# Patient Record
Sex: FEMALE | Race: ASIAN | NOT HISPANIC OR LATINO | Employment: FULL TIME | URBAN - METROPOLITAN AREA
[De-identification: names, ages, dates, MRNs, and addresses within clinical notes are randomized per-mention and may not be internally consistent; named-entity substitution may affect disease eponyms.]

---

## 2018-06-12 ENCOUNTER — HOSPITAL ENCOUNTER (INPATIENT)
Facility: MEDICAL CENTER | Age: 33
LOS: 4 days | DRG: 778 | End: 2018-06-16
Attending: OBSTETRICS & GYNECOLOGY | Admitting: OBSTETRICS & GYNECOLOGY

## 2018-06-12 PROBLEM — O60.00 PREMATURE LABOR: Status: ACTIVE | Noted: 2018-06-12

## 2018-06-12 LAB
ALBUMIN SERPL BCP-MCNC: 3.1 G/DL (ref 3.2–4.9)
ALBUMIN/GLOB SERPL: 0.8 G/DL
ALP SERPL-CCNC: 107 U/L (ref 30–99)
ALT SERPL-CCNC: 9 U/L (ref 2–50)
ANION GAP SERPL CALC-SCNC: 12 MMOL/L (ref 0–11.9)
APPEARANCE UR: CLEAR
AST SERPL-CCNC: 11 U/L (ref 12–45)
BACTERIA GENITAL QL WET PREP: NORMAL
BILIRUB SERPL-MCNC: 0.3 MG/DL (ref 0.1–1.5)
BILIRUB UR QL STRIP.AUTO: NEGATIVE
BUN SERPL-MCNC: 9 MG/DL (ref 8–22)
CALCIUM SERPL-MCNC: 7.5 MG/DL (ref 8.5–10.5)
CHLORIDE SERPL-SCNC: 104 MMOL/L (ref 96–112)
CO2 SERPL-SCNC: 17 MMOL/L (ref 20–33)
COLOR UR: YELLOW
CREAT SERPL-MCNC: 0.48 MG/DL (ref 0.5–1.4)
ERYTHROCYTE [DISTWIDTH] IN BLOOD BY AUTOMATED COUNT: 41.3 FL (ref 35.9–50)
GLOBULIN SER CALC-MCNC: 3.9 G/DL (ref 1.9–3.5)
GLUCOSE BLD-MCNC: 129 MG/DL (ref 65–99)
GLUCOSE SERPL-MCNC: 95 MG/DL (ref 65–99)
GLUCOSE UR STRIP.AUTO-MCNC: NEGATIVE MG/DL
GP B STREP DNA SPEC QL NAA+PROBE: NEGATIVE
HCT VFR BLD AUTO: 36.5 % (ref 37–47)
HGB BLD-MCNC: 12.4 G/DL (ref 12–16)
KETONES UR STRIP.AUTO-MCNC: 15 MG/DL
LEUKOCYTE ESTERASE UR QL STRIP.AUTO: NEGATIVE
MAGNESIUM SERPL-MCNC: 5.9 MG/DL (ref 1.5–2.5)
MCH RBC QN AUTO: 31 PG (ref 27–33)
MCHC RBC AUTO-ENTMCNC: 34 G/DL (ref 33.6–35)
MCV RBC AUTO: 91.3 FL (ref 81.4–97.8)
MICRO URNS: ABNORMAL
NITRITE UR QL STRIP.AUTO: NEGATIVE
PH UR STRIP.AUTO: 8.5 [PH]
PLATELET # BLD AUTO: 238 K/UL (ref 164–446)
PMV BLD AUTO: 10.1 FL (ref 9–12.9)
POTASSIUM SERPL-SCNC: 3.7 MMOL/L (ref 3.6–5.5)
PROT SERPL-MCNC: 7 G/DL (ref 6–8.2)
PROT UR QL STRIP: NEGATIVE MG/DL
RBC # BLD AUTO: 4 M/UL (ref 4.2–5.4)
RBC UR QL AUTO: NEGATIVE
SIGNIFICANT IND 70042: NORMAL
SITE SITE: NORMAL
SODIUM SERPL-SCNC: 133 MMOL/L (ref 135–145)
SOURCE SOURCE: NORMAL
SP GR UR STRIP.AUTO: 1.01
UROBILINOGEN UR STRIP.AUTO-MCNC: 0.2 MG/DL
WBC # BLD AUTO: 13.2 K/UL (ref 4.8–10.8)

## 2018-06-12 PROCEDURE — 700111 HCHG RX REV CODE 636 W/ 250 OVERRIDE (IP): Performed by: ADVANCED PRACTICE MIDWIFE

## 2018-06-12 PROCEDURE — 770002 HCHG ROOM/CARE - OB PRIVATE (112)

## 2018-06-12 PROCEDURE — 83735 ASSAY OF MAGNESIUM: CPT

## 2018-06-12 PROCEDURE — A9270 NON-COVERED ITEM OR SERVICE: HCPCS | Performed by: ADVANCED PRACTICE MIDWIFE

## 2018-06-12 PROCEDURE — 700105 HCHG RX REV CODE 258

## 2018-06-12 PROCEDURE — 82962 GLUCOSE BLOOD TEST: CPT

## 2018-06-12 PROCEDURE — 700111 HCHG RX REV CODE 636 W/ 250 OVERRIDE (IP)

## 2018-06-12 PROCEDURE — 80053 COMPREHEN METABOLIC PANEL: CPT

## 2018-06-12 PROCEDURE — 700111 HCHG RX REV CODE 636 W/ 250 OVERRIDE (IP): Performed by: OBSTETRICS & GYNECOLOGY

## 2018-06-12 PROCEDURE — 36415 COLL VENOUS BLD VENIPUNCTURE: CPT

## 2018-06-12 PROCEDURE — 85027 COMPLETE CBC AUTOMATED: CPT

## 2018-06-12 PROCEDURE — 700112 HCHG RX REV CODE 229: Performed by: ADVANCED PRACTICE MIDWIFE

## 2018-06-12 PROCEDURE — 302790 HCHG STAT ANTEPARTUM CARE, DAILY

## 2018-06-12 PROCEDURE — 700105 HCHG RX REV CODE 258: Performed by: ADVANCED PRACTICE MIDWIFE

## 2018-06-12 PROCEDURE — 81003 URINALYSIS AUTO W/O SCOPE: CPT

## 2018-06-12 PROCEDURE — 87653 STREP B DNA AMP PROBE: CPT

## 2018-06-12 RX ORDER — MAGNESIUM SULFATE HEPTAHYDRATE 40 MG/ML
4 INJECTION, SOLUTION INTRAVENOUS ONCE
Status: COMPLETED | OUTPATIENT
Start: 2018-06-12 | End: 2018-06-12

## 2018-06-12 RX ORDER — DOCUSATE SODIUM 100 MG/1
100 CAPSULE, LIQUID FILLED ORAL 2 TIMES DAILY
Status: DISCONTINUED | OUTPATIENT
Start: 2018-06-12 | End: 2018-06-16 | Stop reason: HOSPADM

## 2018-06-12 RX ORDER — MAGNESIUM SULFATE HEPTAHYDRATE 40 MG/ML
INJECTION, SOLUTION INTRAVENOUS
Status: COMPLETED
Start: 2018-06-12 | End: 2018-06-12

## 2018-06-12 RX ORDER — ACETAMINOPHEN 325 MG/1
650 TABLET ORAL EVERY 4 HOURS PRN
Status: DISCONTINUED | OUTPATIENT
Start: 2018-06-12 | End: 2018-06-16 | Stop reason: HOSPADM

## 2018-06-12 RX ORDER — VITAMIN A ACETATE, BETA CAROTENE, ASCORBIC ACID, CHOLECALCIFEROL, .ALPHA.-TOCOPHEROL ACETATE, DL-, THIAMINE MONONITRATE, RIBOFLAVIN, NIACINAMIDE, PYRIDOXINE HYDROCHLORIDE, FOLIC ACID, CYANOCOBALAMIN, CALCIUM CARBONATE, FERROUS FUMARATE, ZINC OXIDE, CUPRIC OXIDE 3080; 12; 120; 400; 1; 1.84; 3; 20; 22; 920; 25; 200; 27; 10; 2 [IU]/1; UG/1; MG/1; [IU]/1; MG/1; MG/1; MG/1; MG/1; MG/1; [IU]/1; MG/1; MG/1; MG/1; MG/1; MG/1
1 TABLET, FILM COATED ORAL DAILY
Status: DISCONTINUED | OUTPATIENT
Start: 2018-06-13 | End: 2018-06-16 | Stop reason: HOSPADM

## 2018-06-12 RX ORDER — ONDANSETRON 2 MG/ML
4 INJECTION INTRAMUSCULAR; INTRAVENOUS EVERY 6 HOURS PRN
Status: DISCONTINUED | OUTPATIENT
Start: 2018-06-12 | End: 2018-06-16 | Stop reason: HOSPADM

## 2018-06-12 RX ORDER — MAGNESIUM SULFATE HEPTAHYDRATE 40 MG/ML
2 INJECTION, SOLUTION INTRAVENOUS ONCE
Status: DISPENSED | OUTPATIENT
Start: 2018-06-12 | End: 2018-06-13

## 2018-06-12 RX ORDER — BETAMETHASONE SODIUM PHOSPHATE AND BETAMETHASONE ACETATE 3; 3 MG/ML; MG/ML
12 INJECTION, SUSPENSION INTRA-ARTICULAR; INTRALESIONAL; INTRAMUSCULAR; SOFT TISSUE EVERY 24 HOURS
Status: COMPLETED | OUTPATIENT
Start: 2018-06-12 | End: 2018-06-13

## 2018-06-12 RX ORDER — MAGNESIUM SULFATE HEPTAHYDRATE 40 MG/ML
2 INJECTION, SOLUTION INTRAVENOUS CONTINUOUS
Status: DISCONTINUED | OUTPATIENT
Start: 2018-06-12 | End: 2018-06-14

## 2018-06-12 RX ORDER — SODIUM CHLORIDE, SODIUM LACTATE, POTASSIUM CHLORIDE, CALCIUM CHLORIDE 600; 310; 30; 20 MG/100ML; MG/100ML; MG/100ML; MG/100ML
INJECTION, SOLUTION INTRAVENOUS
Status: COMPLETED
Start: 2018-06-12 | End: 2018-06-12

## 2018-06-12 RX ORDER — PENICILLIN G POTASSIUM 5000000 [IU]/1
INJECTION, POWDER, FOR SOLUTION INTRAMUSCULAR; INTRAVENOUS
Status: COMPLETED
Start: 2018-06-12 | End: 2018-06-12

## 2018-06-12 RX ADMIN — MAGNESIUM SULFATE HEPTAHYDRATE 4 G: 40 INJECTION, SOLUTION INTRAVENOUS at 15:28

## 2018-06-12 RX ADMIN — MAGNESIUM SULFATE IN WATER 3 G/HR: 40 INJECTION, SOLUTION INTRAVENOUS at 16:02

## 2018-06-12 RX ADMIN — MAGNESIUM SULFATE IN WATER 2.5 G/HR: 40 INJECTION, SOLUTION INTRAVENOUS at 22:41

## 2018-06-12 RX ADMIN — SODIUM CHLORIDE 2.5 MILLION UNITS: 9 INJECTION, SOLUTION INTRAVENOUS at 22:35

## 2018-06-12 RX ADMIN — DOCUSATE SODIUM 100 MG: 100 CAPSULE ORAL at 19:53

## 2018-06-12 RX ADMIN — MAGNESIUM SULFATE IN WATER 4 G: 40 INJECTION, SOLUTION INTRAVENOUS at 15:28

## 2018-06-12 RX ADMIN — BETAMETHASONE SODIUM PHOSPHATE AND BETAMETHASONE ACETATE 12 MG: 3; 3 INJECTION, SUSPENSION INTRA-ARTICULAR; INTRALESIONAL; INTRAMUSCULAR at 16:02

## 2018-06-12 RX ADMIN — SODIUM CHLORIDE 2.5 MILLION UNITS: 9 INJECTION, SOLUTION INTRAVENOUS at 19:53

## 2018-06-12 RX ADMIN — SODIUM CHLORIDE, POTASSIUM CHLORIDE, SODIUM LACTATE AND CALCIUM CHLORIDE 1000 ML: 600; 310; 30; 20 INJECTION, SOLUTION INTRAVENOUS at 16:03

## 2018-06-12 RX ADMIN — PENICILLIN G POTASSIUM 5 MILLION UNITS: 5000000 POWDER, FOR SOLUTION INTRAMUSCULAR; INTRAPLEURAL; INTRATHECAL; INTRAVENOUS at 15:33

## 2018-06-12 ASSESSMENT — LIFESTYLE VARIABLES: ALCOHOL_USE: NO

## 2018-06-12 ASSESSMENT — PATIENT HEALTH QUESTIONNAIRE - PHQ9
1. LITTLE INTEREST OR PLEASURE IN DOING THINGS: NOT AT ALL
SUM OF ALL RESPONSES TO PHQ9 QUESTIONS 1 AND 2: 0
2. FEELING DOWN, DEPRESSED, IRRITABLE, OR HOPELESS: NOT AT ALL
1. LITTLE INTEREST OR PLEASURE IN DOING THINGS: NOT AT ALL
SUM OF ALL RESPONSES TO PHQ9 QUESTIONS 1 AND 2: 0
2. FEELING DOWN, DEPRESSED, IRRITABLE, OR HOPELESS: NOT AT ALL

## 2018-06-12 NOTE — H&P
"Subjective:       Donna Morocho is a 32 y.o.  female with EDC 2018 at 32 and 4/7 weeks gestation who is being admitted for  labor.  She is a new patient to our practice relocating from [place] 1 week ago. Her  speaks pretty good English. She reported no medical history, surgical history or complications with this pregnancy thus far. She has no venereal disease history. They have been staying with her husbands Aunt. Ultrasound last week showed ? IUGR and was to have repeat scan for growth in 3 weeks. She came to the office this morning to  her 3 hour OGTT slip and reported that she was chanel and had a small amount of spotting. She also reported this on the phone to me yesterday and she was advised to come in to see me for evaluation. She reported it was better and her  was away and they only have one car. She presented to the office this afternoon and a cervical length exam was done transvaginally by Dr. Valerio and patient was found to have a shortened cervix with funneling. Digital exam revealed that she was 3 cm and 50% effaced. She was escorted by myself to L & D to be admitted for magnesium sulfate therapy and steroids.  Patient reports occasional contractions now. She reported normal fetal movement.      Objective:       Temp 36.4 °C (97.5 °F)   Ht 1.55 m (5' 1.02\")   Wt 55 kg (121 lb 4.1 oz)   BMI 22.89 kg/m²     General:   no acute distress   Skin:   normal   HEENT:  PERRLA   Lungs:   CTA bilateral   Heart:   S1, S2 normal, no murmur, click, rub or gallop, regular rate and rhythm, S1, S2 normal, brisk carotid upstroke without bruits, peripheral pulses very brisk, chest is clear without rales or wheezing, no pedal edema, no JVD, no hepatosplenomegaly   Breasts:   Deferred   Abdomen:  Abdomen soft, non-tender. BS normal. No masses,  No organomegaly, Gravid   Pelvis:  Exam deferred.   FHT:  145   Uterine Size: 31 weeks   Presentations: Cephalic   Cervix:     Dilation: 3cm "    Effacement: 50%    Station:  Floating    Consistency: Soft    Position: Middle     Lab Review  AB +  AFP:NML  One hour GTT:  150 (abnormal) Intended to do 3 hour OGTT in next couple of days  Rubella: Immune  RPR: Non-Reactive  HbSag: Negative  HIV: Negative  FHR: 145, moderate variability, + accels; no decelerations  Contractions: q 5 minutes, 60 to 70 seconds; mild to moderate intensity; soft resting tone  Membranes intact    Afebrile  Assessment:      32 and 4/7 weeks gestation.   labor; 3cm/50%.   G1 P 0  Cephalic presentation  GBS culture then prophylaxis; NKDA  Afebrile  Fetus: Category 1; contractions q 5 minutes     Plan:      Admit to antepartum for Magnesium sulfate therapy and steroids.   GBS cultures prior to abx.   Case reviewed and consulted with Dr. Valerio and he agrees with plan. She will be co-managed with M.     Total time 50 min

## 2018-06-13 LAB
GLUCOSE BLD-MCNC: 119 MG/DL (ref 65–99)
GLUCOSE BLD-MCNC: 128 MG/DL (ref 65–99)
GLUCOSE BLD-MCNC: 131 MG/DL (ref 65–99)
GLUCOSE BLD-MCNC: 137 MG/DL (ref 65–99)
GP B STREP DNA SPEC QL NAA+PROBE: NEGATIVE
MAGNESIUM SERPL-MCNC: 6.3 MG/DL (ref 1.5–2.5)
MAGNESIUM SERPL-MCNC: 6.4 MG/DL (ref 1.5–2.5)
MAGNESIUM SERPL-MCNC: 6.9 MG/DL (ref 1.5–2.5)

## 2018-06-13 PROCEDURE — 700105 HCHG RX REV CODE 258

## 2018-06-13 PROCEDURE — 700102 HCHG RX REV CODE 250 W/ 637 OVERRIDE(OP): Performed by: ADVANCED PRACTICE MIDWIFE

## 2018-06-13 PROCEDURE — 83735 ASSAY OF MAGNESIUM: CPT

## 2018-06-13 PROCEDURE — 770002 HCHG ROOM/CARE - OB PRIVATE (112)

## 2018-06-13 PROCEDURE — 36415 COLL VENOUS BLD VENIPUNCTURE: CPT

## 2018-06-13 PROCEDURE — 82962 GLUCOSE BLOOD TEST: CPT

## 2018-06-13 PROCEDURE — 700105 HCHG RX REV CODE 258: Performed by: ADVANCED PRACTICE MIDWIFE

## 2018-06-13 PROCEDURE — A9270 NON-COVERED ITEM OR SERVICE: HCPCS | Performed by: ADVANCED PRACTICE MIDWIFE

## 2018-06-13 PROCEDURE — 700112 HCHG RX REV CODE 229: Performed by: ADVANCED PRACTICE MIDWIFE

## 2018-06-13 PROCEDURE — 700111 HCHG RX REV CODE 636 W/ 250 OVERRIDE (IP): Performed by: ADVANCED PRACTICE MIDWIFE

## 2018-06-13 PROCEDURE — 302790 HCHG STAT ANTEPARTUM CARE, DAILY

## 2018-06-13 PROCEDURE — 700111 HCHG RX REV CODE 636 W/ 250 OVERRIDE (IP): Performed by: OBSTETRICS & GYNECOLOGY

## 2018-06-13 RX ORDER — SODIUM CHLORIDE, SODIUM LACTATE, POTASSIUM CHLORIDE, CALCIUM CHLORIDE 600; 310; 30; 20 MG/100ML; MG/100ML; MG/100ML; MG/100ML
INJECTION, SOLUTION INTRAVENOUS
Status: COMPLETED
Start: 2018-06-13 | End: 2018-06-14

## 2018-06-13 RX ADMIN — BETAMETHASONE SODIUM PHOSPHATE AND BETAMETHASONE ACETATE 12 MG: 3; 3 INJECTION, SUSPENSION INTRA-ARTICULAR; INTRALESIONAL; INTRAMUSCULAR at 16:12

## 2018-06-13 RX ADMIN — SODIUM CHLORIDE 2.5 MILLION UNITS: 9 INJECTION, SOLUTION INTRAVENOUS at 22:27

## 2018-06-13 RX ADMIN — Medication 1 TABLET: at 09:15

## 2018-06-13 RX ADMIN — SODIUM CHLORIDE, POTASSIUM CHLORIDE, SODIUM LACTATE AND CALCIUM CHLORIDE 1000 ML: 600; 310; 30; 20 INJECTION, SOLUTION INTRAVENOUS at 17:02

## 2018-06-13 RX ADMIN — SODIUM CHLORIDE, POTASSIUM CHLORIDE, SODIUM LACTATE AND CALCIUM CHLORIDE 1000 ML: 600; 310; 30; 20 INJECTION, SOLUTION INTRAVENOUS at 06:15

## 2018-06-13 RX ADMIN — DOCUSATE SODIUM 100 MG: 100 CAPSULE ORAL at 22:26

## 2018-06-13 RX ADMIN — SODIUM CHLORIDE 2.5 MILLION UNITS: 9 INJECTION, SOLUTION INTRAVENOUS at 02:37

## 2018-06-13 RX ADMIN — SODIUM CHLORIDE 2.5 MILLION UNITS: 9 INJECTION, SOLUTION INTRAVENOUS at 06:15

## 2018-06-13 RX ADMIN — SODIUM CHLORIDE 2.5 MILLION UNITS: 9 INJECTION, SOLUTION INTRAVENOUS at 09:48

## 2018-06-13 RX ADMIN — SODIUM CHLORIDE 2.5 MILLION UNITS: 9 INJECTION, SOLUTION INTRAVENOUS at 14:32

## 2018-06-13 RX ADMIN — SODIUM CHLORIDE 2.5 MILLION UNITS: 9 INJECTION, SOLUTION INTRAVENOUS at 18:23

## 2018-06-13 RX ADMIN — MAGNESIUM SULFATE IN WATER 3 G/HR: 40 INJECTION, SOLUTION INTRAVENOUS at 17:01

## 2018-06-13 RX ADMIN — DOCUSATE SODIUM 100 MG: 100 CAPSULE ORAL at 09:15

## 2018-06-13 RX ADMIN — MAGNESIUM SULFATE IN WATER 2 G/HR: 40 INJECTION, SOLUTION INTRAVENOUS at 07:50

## 2018-06-13 ASSESSMENT — PAIN SCALES - GENERAL: PAINLEVEL_OUTOF10: 0

## 2018-06-13 NOTE — CONSULTS
Asked by Dr. Valerio to see this patient because of  labor at 32 4/7 weeks. Discussed problems of prematurity at this gestation with parents, and anticipated care and NICU course. Questions answered they appear comfortable with plans.

## 2018-06-13 NOTE — PROGRESS NOTES
[id number] Dr EMERSON Valerio in to see pt. Labs reviewed. Plan of care discussed with pt and family. Sequentials stockings placed on pt.

## 2018-06-13 NOTE — CARE PLAN
Problem: Knowledge Deficit  Goal: Patient/Support Person demonstrates understanding regarding condition, prognosis and treatment needs during the pregnancy    Intervention: Learning assessment and teaching  Pt and  continually updated on POC, verbalizes understanding.      Problem: Infection  Goal: Will remain free from infection    Intervention: Assess signs and symptoms of infection  Monitoring for S/S of infection. Pt free of S/s of infection at this time.

## 2018-06-13 NOTE — H&P
DATE OF ADMISSION:  2018    REASON FOR ADMISSION:   labor.    HISTORY OF PRESENT ILLNESS:  This is a 32-year-old  1, para 0,   currently working LAVELL of 2018, currently 32 week and 4 days, who was   presented to the office today with some degree of vaginal spotting.    Examination reveals that she to be 3 cm dilated and actively chanel.  She   was sent from the office to labor and delivery where she was found to be   chanel approximately every 5-6 minutes.  IV mag sulfate was initiated and   first dose of corticosteroids was administered.    PAST MEDICAL HISTORY:  Unremarkable for any major medical problems, asthma,   seizures, hypertension, cardiovascular, GI,  disease.    OPERATIONS:  None.    TRANSFUSIONS:  None.    DRUG ALLERGIES:  None.    MEDICATIONS:  None.    HABITS:  Tobacco, alcohol and drug use, denied.  Habits are none.    VENEREAL DISEASE HISTORY:  Negative.    PHYSICAL EXAMINATION:  VITAL SIGNS:  All within normal limits.  HEAD:  Normocephalic.  EYES:  No scleral icterus or subconjunctival pallor.  Pupils equal, reactive   to light and accommodation.  Extraocular movements symmetrical.  EAR, NOSE AND THROAT:  Grossly within normal limits.  LUNGS:  Clear.  HEART:  Regular rate and rhythm.  Normal S1 and S2.  No S3, S4 or murmurs.  ABDOMEN:  Soft, gravid uterus.  EXTREMITIES:  No edema or varicosities.  Cervix 3 cm, 56% effaced, -2 station,   vertex.    ASSESSMENT:  1.  Intrauterine pregnancy at 32 weeks and 4 days.  2.   labor.  3.  Recent transfer from [place], assuming care.  4.  Possible glucose intolerance.    PLAN:  Stabilization, IV magnesium sulfate, monitor fingersticks due to the   history of possible with an abnormal 1 hour GCT, but she has not been able to   complete 3-hour GTT.    We discussed the management plan, stabilization, and corticosteroids and we   will discontinue the magnesium after 48 hours.    Prognosis is quite variable.  At this point,  we will see how she responds.    All questions answered to satisfaction, managing with Brianda Valerio CNM.    Time: 35 minutes       ____________________________________     MD REBECCA AYERS / STEVE    DD:  06/12/2018 20:13:03  DT:  06/12/2018 21:04:57    D#:  [id number]  Job#:  [id number]

## 2018-06-13 NOTE — PROGRESS NOTES
0700 report rec'd. Plan of care discussed. Pt sleeping at this time. resp even and unlabored. Pulse ox 98%  0800 pt awake and alert. Pt informed of latest lab resuluts. Tolerating 2gms Magnesium well. Waiting for  to bring in breakfast

## 2018-06-13 NOTE — PROGRESS NOTES
1900: Recieved report from VELASQUEZ Doss RN. POC discussed.    1945: Pt denies feeling painless regular UC's at this time. Denies LOF, VB. Reports +FM. Pt resting in bed with  at bedside for support.    0000: Educated pt on purpose SCD machine to prevent blood clots, pt refused.    0227: Notified Dr. Valerio of Magnesium level of 6.9. 8.5 urine PH and Ketones 15. Received order to turn Mag down from 2.5g/hr to 2g/hr. Pt remains asymptomatic at this time    0700: Report given to SANTINO Toscano. POC discussed.

## 2018-06-13 NOTE — PROGRESS NOTES
Patient here from Dr Valerio's office, in the office she was checked and was 3cm/50%.  Patient to get mag and steriods.  She is 32w4d.    IV started, Mag started, betamethosone given.  Gonzalez placed.     Admission done.  GBS done 10 min after abx started, EMERSON Valerio aware and ok to send.  Patient to get FSBG 1 hour post prandial and and fasting in AM.      Mag running at 3g.hr, adjusted to 2.5g per Dr Valerio.

## 2018-06-14 LAB
A1 MICROGLOB PLACENTAL VAG QL: NEGATIVE
CRYSTALS AMN MICRO: NORMAL
EST. AVERAGE GLUCOSE BLD GHB EST-MCNC: 111 MG/DL
GLUCOSE BLD-MCNC: 105 MG/DL (ref 65–99)
GLUCOSE BLD-MCNC: 115 MG/DL (ref 65–99)
GLUCOSE BLD-MCNC: 126 MG/DL (ref 65–99)
GLUCOSE BLD-MCNC: 132 MG/DL (ref 65–99)
HBA1C MFR BLD: 5.5 % (ref 0–5.6)

## 2018-06-14 PROCEDURE — 302790 HCHG STAT ANTEPARTUM CARE, DAILY

## 2018-06-14 PROCEDURE — 36415 COLL VENOUS BLD VENIPUNCTURE: CPT

## 2018-06-14 PROCEDURE — 700112 HCHG RX REV CODE 229: Performed by: ADVANCED PRACTICE MIDWIFE

## 2018-06-14 PROCEDURE — 82962 GLUCOSE BLOOD TEST: CPT

## 2018-06-14 PROCEDURE — A9270 NON-COVERED ITEM OR SERVICE: HCPCS | Performed by: ADVANCED PRACTICE MIDWIFE

## 2018-06-14 PROCEDURE — 700102 HCHG RX REV CODE 250 W/ 637 OVERRIDE(OP): Performed by: ADVANCED PRACTICE MIDWIFE

## 2018-06-14 PROCEDURE — 700111 HCHG RX REV CODE 636 W/ 250 OVERRIDE (IP): Performed by: ADVANCED PRACTICE MIDWIFE

## 2018-06-14 PROCEDURE — 700105 HCHG RX REV CODE 258

## 2018-06-14 PROCEDURE — 89060 EXAM SYNOVIAL FLUID CRYSTALS: CPT

## 2018-06-14 PROCEDURE — 84112 EVAL AMNIOTIC FLUID PROTEIN: CPT

## 2018-06-14 PROCEDURE — 83036 HEMOGLOBIN GLYCOSYLATED A1C: CPT

## 2018-06-14 PROCEDURE — 700105 HCHG RX REV CODE 258: Performed by: ADVANCED PRACTICE MIDWIFE

## 2018-06-14 PROCEDURE — 770002 HCHG ROOM/CARE - OB PRIVATE (112)

## 2018-06-14 RX ORDER — SODIUM CHLORIDE, SODIUM LACTATE, POTASSIUM CHLORIDE, CALCIUM CHLORIDE 600; 310; 30; 20 MG/100ML; MG/100ML; MG/100ML; MG/100ML
INJECTION, SOLUTION INTRAVENOUS
Status: COMPLETED
Start: 2018-06-14 | End: 2018-06-14

## 2018-06-14 RX ADMIN — MAGNESIUM SULFATE IN WATER 2 G/HR: 40 INJECTION, SOLUTION INTRAVENOUS at 02:45

## 2018-06-14 RX ADMIN — SODIUM CHLORIDE 2.5 MILLION UNITS: 9 INJECTION, SOLUTION INTRAVENOUS at 10:06

## 2018-06-14 RX ADMIN — MAGNESIUM SULFATE IN WATER 2 G/HR: 40 INJECTION, SOLUTION INTRAVENOUS at 13:26

## 2018-06-14 RX ADMIN — SODIUM CHLORIDE 2.5 MILLION UNITS: 9 INJECTION, SOLUTION INTRAVENOUS at 14:06

## 2018-06-14 RX ADMIN — DOCUSATE SODIUM 100 MG: 100 CAPSULE ORAL at 09:43

## 2018-06-14 RX ADMIN — SODIUM CHLORIDE, POTASSIUM CHLORIDE, SODIUM LACTATE AND CALCIUM CHLORIDE 1000 ML: 600; 310; 30; 20 INJECTION, SOLUTION INTRAVENOUS at 07:34

## 2018-06-14 RX ADMIN — SODIUM CHLORIDE 2.5 MILLION UNITS: 9 INJECTION, SOLUTION INTRAVENOUS at 06:05

## 2018-06-14 RX ADMIN — SODIUM CHLORIDE 2.5 MILLION UNITS: 9 INJECTION, SOLUTION INTRAVENOUS at 02:10

## 2018-06-14 RX ADMIN — INSULIN HUMAN 8 UNITS: 100 INJECTION, SUSPENSION SUBCUTANEOUS at 21:20

## 2018-06-14 RX ADMIN — Medication 1 TABLET: at 09:43

## 2018-06-14 RX ADMIN — DOCUSATE SODIUM 100 MG: 100 CAPSULE ORAL at 21:21

## 2018-06-14 RX ADMIN — SODIUM CHLORIDE 2.5 MILLION UNITS: 9 INJECTION, SOLUTION INTRAVENOUS at 22:19

## 2018-06-14 RX ADMIN — SODIUM CHLORIDE 2.5 MILLION UNITS: 9 INJECTION, SOLUTION INTRAVENOUS at 18:06

## 2018-06-14 ASSESSMENT — PAIN SCALES - GENERAL
PAINLEVEL_OUTOF10: 0
PAINLEVEL_OUTOF10: 1
PAINLEVEL_OUTOF10: 1
PAINLEVEL_OUTOF10: 0

## 2018-06-14 ASSESSMENT — PATIENT HEALTH QUESTIONNAIRE - PHQ9
1. LITTLE INTEREST OR PLEASURE IN DOING THINGS: NOT AT ALL
SUM OF ALL RESPONSES TO PHQ9 QUESTIONS 1 AND 2: 0
2. FEELING DOWN, DEPRESSED, IRRITABLE, OR HOPELESS: NOT AT ALL

## 2018-06-14 NOTE — PROGRESS NOTES
0700- Report received from KYMBERLY Sauer RN. Pt very anxious awaiting results of amnisure and fern. Pt denies VB. Pt has occas, mild cramping. UC's palpate mild.  No fluid present on pad upon RN assessment. Pt reports +FM. Hughson on.  Amnisure and fern- negative.  0740- US and toco on.   1hr reactive tracing obtained.  0940- FSBS 1hr pp, 132.  1150- pt requesting a GDM lunch tray, food order called in to dietary services.  1401-  FSBS 1hr pp, 105. Snack ordered, per pt's request.  1600- Magnesium sulfate discontinued, per FREDERIC Valerio.  Order from FREDERIC Valerio to remove reyes once pt is steady.  1649- pt assisted to the restroom to have a BM, pt had RN and CNA assistance, pt very weak and unsteady.  1900- Report given to KYMBERLY Sauer RN.

## 2018-06-14 NOTE — PROGRESS NOTES
IUP @ 32 weeks 6 days,  labor, Possible GDM with elevated fasting blood sugars    S: Patient feels well. Feels mild pressure with contractions, but intensity has not changed. Had copious amounts of thin white milky discharge with no odor this am. No longer leaking discharge. Denies vaginal itching or irritation. Slept about 2 hours last night.     O: Jaret 5 in an hour; mild       FHR: Baseline 130; moderate variability; + accels; no decels       Fasting blood sugars elevated in 120's        Negative fern/amnisure        Magnesium infusing at 2 g/hour; therapeutic level        Afebrile; VSS    A: Negative for SROM       labor: Last cervical check revealed 3cm and 50% effaced (no vaginal exams       Since admission).       Fetus: Reactive NST      Afebrile; VSS      Therapeutic on magnesium; received 2nd steroid dose yesterday at 1600.      Possible GDM/elevated fasting blood sugars    P: Magnesium off @ 1600 as steroids in 48 hours      Check hemoglobin A1C      Start NPH insulin at bedtime @ 8 units.       Cut out simple sugars from diet      Q 1 hour shift monitoring, continuous if contractions become stronger      Total time: 30 min

## 2018-06-14 NOTE — PROGRESS NOTES
1715 call placed to EMERSON Valerio to discuss Mag levels and FHT's. Orders rec'd to Discontinue q 6hr mg levels, and order rec'd for q shift,1hr FHR monitoring.

## 2018-06-14 NOTE — PROGRESS NOTES
1900 Assumed pt care. Pt resting in bed with support persons present. Pt denies pain or contractions at this time.   2230 Pt continues to deny pain or contractions

## 2018-06-14 NOTE — PROGRESS NOTES
0620 Pt called out with c/o fluid leaking. Milky thick discharge noted on under pad and perineum. Kathleen care done, underpad changed.   0631 LVM for FREDERIC Valerio CNM to return call.   0640 FREDERIC Valerio CNM notified of discharge. New orders given.  0648 Amnisure obtained  0650 Nichol obtained

## 2018-06-14 NOTE — CARE PLAN
Problem: Risk for Infection, Impaired Wound Healing  Goal: Remain free from signs and symptoms of infection    Intervention: Administer antibiotic IV per MD order  IV ABX administered per MD to prevent risk of infection.      Problem: Fatigue  Goal: Patient will use techniques to conserve energy    Intervention: Plan care to limit interruptions  Care clustered to limit interruptions to promote rest and combat fatigue.

## 2018-06-15 LAB
GLUCOSE BLD-MCNC: 106 MG/DL (ref 65–99)
GLUCOSE BLD-MCNC: 109 MG/DL (ref 65–99)
GLUCOSE BLD-MCNC: 111 MG/DL (ref 65–99)
GLUCOSE BLD-MCNC: 96 MG/DL (ref 65–99)

## 2018-06-15 PROCEDURE — 700111 HCHG RX REV CODE 636 W/ 250 OVERRIDE (IP): Performed by: ADVANCED PRACTICE MIDWIFE

## 2018-06-15 PROCEDURE — A9270 NON-COVERED ITEM OR SERVICE: HCPCS | Performed by: ADVANCED PRACTICE MIDWIFE

## 2018-06-15 PROCEDURE — 82962 GLUCOSE BLOOD TEST: CPT | Mod: 91

## 2018-06-15 PROCEDURE — 700102 HCHG RX REV CODE 250 W/ 637 OVERRIDE(OP): Performed by: ADVANCED PRACTICE MIDWIFE

## 2018-06-15 PROCEDURE — 700105 HCHG RX REV CODE 258: Performed by: ADVANCED PRACTICE MIDWIFE

## 2018-06-15 PROCEDURE — 770002 HCHG ROOM/CARE - OB PRIVATE (112)

## 2018-06-15 PROCEDURE — 302790 HCHG STAT ANTEPARTUM CARE, DAILY

## 2018-06-15 PROCEDURE — 59025 FETAL NON-STRESS TEST: CPT | Performed by: ADVANCED PRACTICE MIDWIFE

## 2018-06-15 RX ADMIN — SODIUM CHLORIDE 2.5 MILLION UNITS: 9 INJECTION, SOLUTION INTRAVENOUS at 06:15

## 2018-06-15 RX ADMIN — SODIUM CHLORIDE 2.5 MILLION UNITS: 9 INJECTION, SOLUTION INTRAVENOUS at 10:07

## 2018-06-15 RX ADMIN — Medication 1 TABLET: at 09:12

## 2018-06-15 RX ADMIN — INSULIN HUMAN 8 UNITS: 100 INJECTION, SUSPENSION SUBCUTANEOUS at 21:15

## 2018-06-15 RX ADMIN — SODIUM CHLORIDE 2.5 MILLION UNITS: 9 INJECTION, SOLUTION INTRAVENOUS at 01:57

## 2018-06-15 RX ADMIN — SODIUM CHLORIDE 2.5 MILLION UNITS: 9 INJECTION, SOLUTION INTRAVENOUS at 13:55

## 2018-06-15 ASSESSMENT — PATIENT HEALTH QUESTIONNAIRE - PHQ9
1. LITTLE INTEREST OR PLEASURE IN DOING THINGS: NOT AT ALL
1. LITTLE INTEREST OR PLEASURE IN DOING THINGS: NOT AT ALL
2. FEELING DOWN, DEPRESSED, IRRITABLE, OR HOPELESS: NOT AT ALL
SUM OF ALL RESPONSES TO PHQ9 QUESTIONS 1 AND 2: 0
SUM OF ALL RESPONSES TO PHQ9 QUESTIONS 1 AND 2: 0
2. FEELING DOWN, DEPRESSED, IRRITABLE, OR HOPELESS: NOT AT ALL

## 2018-06-15 ASSESSMENT — PAIN SCALES - GENERAL
PAINLEVEL_OUTOF10: 0

## 2018-06-15 NOTE — DIETARY
Nutrition Services:  Met with patient and  to review gestational diabetes meal plan.  Discussed the importance of snacks including protein.  Reviewed meal and snack preferences.  Nutrition Representative will continue to see her for ongoing meal and snack preferences.  RD following.

## 2018-06-15 NOTE — CARE PLAN
Problem: Fluid Volume:  Goal: Will maintain balanced intake and output    Intervention: Monitor, educate, and encourage compliance with therapeutic intake of liquids  IV saline locked in between abx. Pt encouraged to increase fluid intake. Pt verbalized understanding.       Problem: Mobility  Goal: Risk for activity intolerance will decrease    Intervention: Assess and monitor signs of activity intolerance  Pt off magnesium. Gonzalez cath out. Pt assisted to bathroom x2 and up for partial bath. Pt instructed she can get up to bathroom without assistance but to call RN or CNA if she feels dizzy or that she needs help to the bathroom. Pt verbalized understanding.

## 2018-06-15 NOTE — PROGRESS NOTES
1900 Assumed pt care.   1945 Pt resting in bed without complaints of pain at this time.   2140 Pt up to bathroom. Partial bath, esperanza care done, linens changed, new gown to pt.   0000 Pt denies abd pain or contractions.  0630 Pt reports she is feeling occasional tightening in her abd but is not feeling pain.  0700 Pt remained stable throughout the night with no c/o pain. Report to SANTINO Bryant

## 2018-06-15 NOTE — PROGRESS NOTES
"0700- Report received from KYMBERLY Sauer RN. Pt denies LOF, VB or UC's. Pt reports +FM.    0915- 1hr pp, FSBS-109.  1323- 1hr pp, FSBS-106.  1430- pt having discomfort with her IV, IV leaking. New IV placed. Pt states, \"That IV feels much better.\"  1615- EMERSON. Teodoro at the bedside, IV ABX discontinued.  POC for SHIVA Valerio to recheck pt tomorrow, if no change, pt will be discharged home.  1835- 1hr pp, FSBS-111. Pt educated on not eating after 10pm for accurate fasting blood sugars.  1900- Report given to FELIPE Manley RN.  "

## 2018-06-15 NOTE — PROGRESS NOTES
IUP @ 33 weeks,  labor, glucose intolerance-normal hemoglobin A1C    S: Feels better with magnesium off. Does not feel contractions. No further discharge noted by RN.     O: VSS; afebrile;        Fetus: Baseline 120                  Variability: Moderate                   Accels: +                    Decels: -        Uterus: 0 to 5 contractions per hour        GBS Negative         Fasting blood sugars better in 90's but patient eating in the middle of the night        Following GDM diet         Steroids completed and 48 hour out since yesterday @ 1600.     A: Stable antepartum/ labor @ 33 weeks      GBS neg      Minimal contractions       Fetus: Reactive NST       Impaired glucose intolerance with normal Hemoglobin A1C      Steroids on board    P: Watch overnight. Possible discharge tomorrow if no cervical change.       Continue 1 hour q shift monitoring       D/C antibiotics-GBS negative        Continue 8 units of NPH insulin at bedtime/GDM diet. Encouraged last snack at        Bedtime unless she feels her sugar is low.       Co-manage with Dr. Valerio. He is aware and agrees with plan.     Total time: 20 minutes

## 2018-06-16 VITALS
WEIGHT: 121.25 LBS | RESPIRATION RATE: 16 BRPM | TEMPERATURE: 97.3 F | OXYGEN SATURATION: 97 % | BODY MASS INDEX: 22.89 KG/M2 | DIASTOLIC BLOOD PRESSURE: 68 MMHG | HEIGHT: 61 IN | SYSTOLIC BLOOD PRESSURE: 120 MMHG | HEART RATE: 107 BPM

## 2018-06-16 LAB
GLUCOSE BLD-MCNC: 115 MG/DL (ref 65–99)
GLUCOSE BLD-MCNC: 78 MG/DL (ref 65–99)
GLUCOSE BLD-MCNC: 92 MG/DL (ref 65–99)

## 2018-06-16 PROCEDURE — A9270 NON-COVERED ITEM OR SERVICE: HCPCS | Performed by: ADVANCED PRACTICE MIDWIFE

## 2018-06-16 PROCEDURE — 700112 HCHG RX REV CODE 229: Performed by: ADVANCED PRACTICE MIDWIFE

## 2018-06-16 PROCEDURE — 82962 GLUCOSE BLOOD TEST: CPT | Mod: 91

## 2018-06-16 PROCEDURE — 700102 HCHG RX REV CODE 250 W/ 637 OVERRIDE(OP): Performed by: ADVANCED PRACTICE MIDWIFE

## 2018-06-16 RX ADMIN — Medication 1 TABLET: at 09:29

## 2018-06-16 RX ADMIN — DOCUSATE SODIUM 100 MG: 100 CAPSULE ORAL at 09:29

## 2018-06-16 ASSESSMENT — PAIN SCALES - GENERAL
PAINLEVEL_OUTOF10: 0

## 2018-06-16 ASSESSMENT — PATIENT HEALTH QUESTIONNAIRE - PHQ9
SUM OF ALL RESPONSES TO PHQ9 QUESTIONS 1 AND 2: 0
1. LITTLE INTEREST OR PLEASURE IN DOING THINGS: NOT AT ALL
2. FEELING DOWN, DEPRESSED, IRRITABLE, OR HOPELESS: NOT AT ALL

## 2018-06-16 NOTE — DISCHARGE SUMMARY
IUP @ 33 weeks and 1 day.     Admit Dx:  Labor     Discharge Dx:  labor, now stable with completed steroid therapy    S: Feels well. Not feeling contractions. Had an episode of a small amount of spotting 10 minutes ago.      O: VSS; afebrile       Fasting and postparandial sugars within desired ranges        Contractions: 1 to 2 per hour       SVE: 3/50/0; small amount of blood on glove-Examined by Dr. Valerio     A:  labor @ 33 weeks 1 day; stable       Post-magnesium sulfate therapy and steroids completed      Impaired glucose intolerance~fasting and postparandials       Cervix unchanged although lower station.      P: We planned to keep her over night but due to cost and being self-pay, she       Requested to go home. Dr. Teodoro parnell with this plan.        She will be discharged home today.         She needs to follow up with me in the office on 2018 for sugar check and       OB follow up       Her huband was instructed to bring her back to the hospital ASAP if she starts        Jaret and/or bleeding, which he agreed and acknowledged understanding.

## 2018-06-16 NOTE — CARE PLAN
"Problem: Risk for Deep Vein Thrombosis/Venous Thromboembolism  Goal: DVT/VTE Prevention Measures in Place    Intervention: Intermittent sequential compression device in place per MD order  SCDs in place and functioning      Problem: Risk for Infection, Impaired Wound Healing  Goal: Remain free from signs and symptoms of infection  Outcome: PROGRESSING AS EXPECTED  Pt is afebrile    Problem: Risk for injury  Goal: Patient and fetus will be free of preventable injury/complications  Outcome: PROGRESSING AS EXPECTED  Pt placed on EFM and TOCO to monitor fetal well being and uterine activity. Reactive NST obtained.     Problem: Pain  Goal: Alleviation of Pain or a reduction in pain to the patient's comfort goal  Outcome: PROGRESSING AS EXPECTED  Pain from UCs is described as \"tolerable\". Declines intervention.      "

## 2018-06-16 NOTE — PROGRESS NOTES
IUP @ 33 weeks 1 day;  Labor; Impaired glucose intolerance    S: Feels well. Not feeling contractions. Had an episode of a small amount of spotting 10 minutes ago.     O: VSS; afebrile       Fasting and postparandial sugars within desired ranges        Contractions: 1 to 2 per hour       SVE: 3/50/0; small amount of blood on glove-Examined by Dr. Valerio    A:  labor @ 33 weeks 1 day; stable       Post-magnesium sulfate therapy and steroids completed      Impaired glucose intolerance~fasting and postparandials       Cervix unchanged although lower station.     P: We planned to keep her over night but due to cost and being self-pay, she       Requested to go home. Dr. Teodoro aprnell with this plan.        She needs to follow up with me in the office on Tuesday for sugar check and        OB follow up       Her huband was instructed to bring her back to the hospital ASAP if she starts        Jaret and/or bleeding, which he agreed and acknowledged understanding.       Total time: 10 minutes

## 2018-06-16 NOTE — CARE PLAN
Problem: Pain  Goal: Alleviation of Pain or a reduction in pain to the patient's comfort goal  Outcome: MET Date Met: 06/16/18  Pt not having pain with her UC's, pt understands to notify RN if she is hurting at all.

## 2018-06-16 NOTE — PROGRESS NOTES
1900- Report received from AIDE Davis RN. POC discussed.     2115- Pt assessment. Denies VB, LOF, pain, or UCs. Reports +FM.    2229- Fetal non-stress test complete. Reactive tracing obtained.    0633- FSBS 78. No change from previous assessment.    0730- Report given to YVONNE Coppola RN. POC discussed.

## 2018-06-16 NOTE — CARE PLAN
Problem: Knowledge Deficit  Goal: Patient/Support Person demonstrates understanding regarding condition, prognosis and treatment needs during the pregnancy    Intervention: Learning assessment and teaching  POC discussed with pt and family.  Family states understanding at this time and has no further questions.  Awaiting MD for DC orders.

## 2018-06-16 NOTE — PROGRESS NOTES
0700  Report from NOC RN in room with pt at this time.  Pt resting and RN will return shortly.  0930  RN in room with pt, pt reports positive fetal movement.  Pt has no report of bleeding or leaking and has no report of painful UC's.  POC discussed at this time and pt states understanding.  Awaiting visit from Dr. Valerio for SVE recheck and possible DC.  1140  Monitors placed at this time for HT's.  1240  Monitors removed and continued discussion of POC for further waiting on Dr. Valerio.  1430  Brianda Valerio and Dr. Valerio in room with pt, POC discussed and repeat SVE with no cervical change.  Pt reports that when she went to the BR she had a little bleeding when she wiped.  Discussion to keep the pt 1 more night for monitoring and pt desires to go home tonight and to return if she has any further bleeding or goes into labor.  Pt reports that she will follow up at Dr. Dennis office on Teusday for a BS check.  Pt understands not to eat before that recheck.  Pt states she understands pelvic rest and to rest and relax until her next visit.  1445  DC orders given at this time.  1500  IV removed and DC instructions given.  1515  Pt into regular clothing and to home at this time.

## 2018-07-02 ENCOUNTER — HOSPITAL ENCOUNTER (INPATIENT)
Facility: MEDICAL CENTER | Age: 33
LOS: 2 days | End: 2018-07-04
Attending: OBSTETRICS & GYNECOLOGY | Admitting: OBSTETRICS & GYNECOLOGY
Payer: COMMERCIAL

## 2018-07-02 LAB
BASOPHILS # BLD AUTO: 0.3 % (ref 0–1.8)
BASOPHILS # BLD: 0.03 K/UL (ref 0–0.12)
EOSINOPHIL # BLD AUTO: 0.04 K/UL (ref 0–0.51)
EOSINOPHIL NFR BLD: 0.3 % (ref 0–6.9)
ERYTHROCYTE [DISTWIDTH] IN BLOOD BY AUTOMATED COUNT: 42.9 FL (ref 35.9–50)
HCT VFR BLD AUTO: 39.6 % (ref 37–47)
HGB BLD-MCNC: 12.9 G/DL (ref 12–16)
HOLDING TUBE BB 8507: NORMAL
IMM GRANULOCYTES # BLD AUTO: 0.05 K/UL (ref 0–0.11)
IMM GRANULOCYTES NFR BLD AUTO: 0.4 % (ref 0–0.9)
LYMPHOCYTES # BLD AUTO: 1.91 K/UL (ref 1–4.8)
LYMPHOCYTES NFR BLD: 16.4 % (ref 22–41)
MCH RBC QN AUTO: 29.8 PG (ref 27–33)
MCHC RBC AUTO-ENTMCNC: 32.6 G/DL (ref 33.6–35)
MCV RBC AUTO: 91.5 FL (ref 81.4–97.8)
MONOCYTES # BLD AUTO: 0.87 K/UL (ref 0–0.85)
MONOCYTES NFR BLD AUTO: 7.5 % (ref 0–13.4)
NEUTROPHILS # BLD AUTO: 8.72 K/UL (ref 2–7.15)
NEUTROPHILS NFR BLD: 75.1 % (ref 44–72)
NRBC # BLD AUTO: 0 K/UL
NRBC BLD-RTO: 0 /100 WBC
PLATELET # BLD AUTO: 268 K/UL (ref 164–446)
PMV BLD AUTO: 10.9 FL (ref 9–12.9)
RBC # BLD AUTO: 4.33 M/UL (ref 4.2–5.4)
WBC # BLD AUTO: 11.6 K/UL (ref 4.8–10.8)

## 2018-07-02 PROCEDURE — 304965 HCHG RECOVERY SERVICES

## 2018-07-02 PROCEDURE — 0HQ9XZZ REPAIR PERINEUM SKIN, EXTERNAL APPROACH: ICD-10-PCS | Performed by: OBSTETRICS & GYNECOLOGY

## 2018-07-02 PROCEDURE — 700111 HCHG RX REV CODE 636 W/ 250 OVERRIDE (IP): Performed by: OBSTETRICS & GYNECOLOGY

## 2018-07-02 PROCEDURE — 770002 HCHG ROOM/CARE - OB PRIVATE (112)

## 2018-07-02 PROCEDURE — 0UQMXZZ REPAIR VULVA, EXTERNAL APPROACH: ICD-10-PCS | Performed by: OBSTETRICS & GYNECOLOGY

## 2018-07-02 PROCEDURE — 85025 COMPLETE CBC W/AUTO DIFF WBC: CPT

## 2018-07-02 PROCEDURE — 700105 HCHG RX REV CODE 258: Performed by: OBSTETRICS & GYNECOLOGY

## 2018-07-02 PROCEDURE — 59409 OBSTETRICAL CARE: CPT

## 2018-07-02 PROCEDURE — 36415 COLL VENOUS BLD VENIPUNCTURE: CPT

## 2018-07-02 PROCEDURE — 700111 HCHG RX REV CODE 636 W/ 250 OVERRIDE (IP)

## 2018-07-02 RX ORDER — BISACODYL 10 MG
10 SUPPOSITORY, RECTAL RECTAL PRN
Status: DISCONTINUED | OUTPATIENT
Start: 2018-07-02 | End: 2018-07-04 | Stop reason: HOSPADM

## 2018-07-02 RX ORDER — OXYCODONE AND ACETAMINOPHEN 10; 325 MG/1; MG/1
1 TABLET ORAL EVERY 4 HOURS PRN
Status: DISCONTINUED | OUTPATIENT
Start: 2018-07-02 | End: 2018-07-04 | Stop reason: HOSPADM

## 2018-07-02 RX ORDER — METHYLERGONOVINE MALEATE 0.2 MG/ML
0.2 INJECTION INTRAVENOUS
Status: DISCONTINUED | OUTPATIENT
Start: 2018-07-02 | End: 2018-07-04 | Stop reason: HOSPADM

## 2018-07-02 RX ORDER — METHYLERGONOVINE MALEATE 0.2 MG/ML
0.2 INJECTION INTRAVENOUS
Status: DISCONTINUED | OUTPATIENT
Start: 2018-07-02 | End: 2018-07-02 | Stop reason: HOSPADM

## 2018-07-02 RX ORDER — OXYCODONE HYDROCHLORIDE AND ACETAMINOPHEN 5; 325 MG/1; MG/1
1 TABLET ORAL EVERY 4 HOURS PRN
Status: DISCONTINUED | OUTPATIENT
Start: 2018-07-02 | End: 2018-07-04 | Stop reason: HOSPADM

## 2018-07-02 RX ORDER — ONDANSETRON 2 MG/ML
4 INJECTION INTRAMUSCULAR; INTRAVENOUS EVERY 6 HOURS PRN
Status: DISCONTINUED | OUTPATIENT
Start: 2018-07-02 | End: 2018-07-04 | Stop reason: HOSPADM

## 2018-07-02 RX ORDER — SODIUM CHLORIDE, SODIUM LACTATE, POTASSIUM CHLORIDE, CALCIUM CHLORIDE 600; 310; 30; 20 MG/100ML; MG/100ML; MG/100ML; MG/100ML
INJECTION, SOLUTION INTRAVENOUS PRN
Status: DISCONTINUED | OUTPATIENT
Start: 2018-07-02 | End: 2018-07-04 | Stop reason: HOSPADM

## 2018-07-02 RX ORDER — DEXTROSE, SODIUM CHLORIDE, SODIUM LACTATE, POTASSIUM CHLORIDE, AND CALCIUM CHLORIDE 5; .6; .31; .03; .02 G/100ML; G/100ML; G/100ML; G/100ML; G/100ML
INJECTION, SOLUTION INTRAVENOUS CONTINUOUS
Status: DISCONTINUED | OUTPATIENT
Start: 2018-07-02 | End: 2018-07-02 | Stop reason: HOSPADM

## 2018-07-02 RX ORDER — IBUPROFEN 600 MG/1
600 TABLET ORAL EVERY 6 HOURS PRN
Status: DISCONTINUED | OUTPATIENT
Start: 2018-07-02 | End: 2018-07-04 | Stop reason: HOSPADM

## 2018-07-02 RX ORDER — LIDOCAINE HYDROCHLORIDE 10 MG/ML
INJECTION, SOLUTION EPIDURAL; INFILTRATION; INTRACAUDAL; PERINEURAL
Status: COMPLETED
Start: 2018-07-02 | End: 2018-07-02

## 2018-07-02 RX ORDER — OXYCODONE HYDROCHLORIDE AND ACETAMINOPHEN 5; 325 MG/1; MG/1
1 TABLET ORAL EVERY 8 HOURS PRN
Status: DISCONTINUED | OUTPATIENT
Start: 2018-07-02 | End: 2018-07-04 | Stop reason: HOSPADM

## 2018-07-02 RX ORDER — ONDANSETRON 4 MG/1
4 TABLET, ORALLY DISINTEGRATING ORAL EVERY 6 HOURS PRN
Status: DISCONTINUED | OUTPATIENT
Start: 2018-07-02 | End: 2018-07-04 | Stop reason: HOSPADM

## 2018-07-02 RX ORDER — SODIUM CHLORIDE, SODIUM LACTATE, POTASSIUM CHLORIDE, CALCIUM CHLORIDE 600; 310; 30; 20 MG/100ML; MG/100ML; MG/100ML; MG/100ML
INJECTION, SOLUTION INTRAVENOUS CONTINUOUS
Status: DISCONTINUED | OUTPATIENT
Start: 2018-07-02 | End: 2018-07-02 | Stop reason: HOSPADM

## 2018-07-02 RX ORDER — DOCUSATE SODIUM 100 MG/1
100 CAPSULE, LIQUID FILLED ORAL 2 TIMES DAILY PRN
Status: DISCONTINUED | OUTPATIENT
Start: 2018-07-02 | End: 2018-07-04 | Stop reason: HOSPADM

## 2018-07-02 RX ORDER — VITAMIN A ACETATE, BETA CAROTENE, ASCORBIC ACID, CHOLECALCIFEROL, .ALPHA.-TOCOPHEROL ACETATE, DL-, THIAMINE MONONITRATE, RIBOFLAVIN, NIACINAMIDE, PYRIDOXINE HYDROCHLORIDE, FOLIC ACID, CYANOCOBALAMIN, CALCIUM CARBONATE, FERROUS FUMARATE, ZINC OXIDE, CUPRIC OXIDE 3080; 12; 120; 400; 1; 1.84; 3; 20; 22; 920; 25; 200; 27; 10; 2 [IU]/1; UG/1; MG/1; [IU]/1; MG/1; MG/1; MG/1; MG/1; MG/1; [IU]/1; MG/1; MG/1; MG/1; MG/1; MG/1
1 TABLET, FILM COATED ORAL EVERY MORNING
Status: DISCONTINUED | OUTPATIENT
Start: 2018-07-03 | End: 2018-07-04 | Stop reason: HOSPADM

## 2018-07-02 RX ADMIN — SODIUM CHLORIDE, POTASSIUM CHLORIDE, SODIUM LACTATE AND CALCIUM CHLORIDE: 600; 310; 30; 20 INJECTION, SOLUTION INTRAVENOUS at 13:30

## 2018-07-02 RX ADMIN — LIDOCAINE HYDROCHLORIDE: 10 INJECTION, SOLUTION EPIDURAL; INFILTRATION; INTRACAUDAL; PERINEURAL at 16:45

## 2018-07-02 RX ADMIN — Medication 125 ML/HR: at 18:01

## 2018-07-02 RX ADMIN — Medication 20 UNITS: at 16:55

## 2018-07-02 RX ADMIN — SODIUM CHLORIDE 5 MILLION UNITS: 900 INJECTION INTRAVENOUS at 15:24

## 2018-07-02 RX ADMIN — FENTANYL CITRATE 100 MCG: 50 INJECTION, SOLUTION INTRAMUSCULAR; INTRAVENOUS at 15:24

## 2018-07-02 ASSESSMENT — LIFESTYLE VARIABLES
EVER_SMOKED: NEVER
ALCOHOL_USE: NO

## 2018-07-02 ASSESSMENT — PATIENT HEALTH QUESTIONNAIRE - PHQ9
SUM OF ALL RESPONSES TO PHQ9 QUESTIONS 1 AND 2: 0
2. FEELING DOWN, DEPRESSED, IRRITABLE, OR HOPELESS: NOT AT ALL
1. LITTLE INTEREST OR PLEASURE IN DOING THINGS: NOT AT ALL
2. FEELING DOWN, DEPRESSED, IRRITABLE, OR HOPELESS: NOT AT ALL
1. LITTLE INTEREST OR PLEASURE IN DOING THINGS: NOT AT ALL
SUM OF ALL RESPONSES TO PHQ9 QUESTIONS 1 AND 2: 0

## 2018-07-02 NOTE — PROGRESS NOTES
LAVELL  EGA 35.3    Positive FM, denies VB    PT presetting with CO SROM/cl at 1145.  She stated that she has been having painful UC's that started in AM.     Sterile speculum placed. Gross pooling noted, amnisure collected (blood noted). [facility] -2    1250 Call with report to Dr. Valerio, orders for admission received.     1310 Teodoro at BS    1315 Report to Lena

## 2018-07-02 NOTE — CARE PLAN
Problem: Infection  Goal: Will remain free from infection  Outcome: PROGRESSING AS EXPECTED  Pt is free from infection  Intervention: Assess signs and symptoms of infection  Pt is free from s/s of infection  Intervention: Implement standard precautions and perform hand washing before and after patient contact  Hand hygiene performed before and after pt contact      Problem: Pain  Goal: Alleviation of Pain or a reduction in pain to the patient's comfort goal    Intervention: Pain Management--Medications  Pt educated on medications available for pain

## 2018-07-03 LAB
ERYTHROCYTE [DISTWIDTH] IN BLOOD BY AUTOMATED COUNT: 42.3 FL (ref 35.9–50)
HCT VFR BLD AUTO: 36.4 % (ref 37–47)
HGB BLD-MCNC: 12 G/DL (ref 12–16)
MCH RBC QN AUTO: 30.4 PG (ref 27–33)
MCHC RBC AUTO-ENTMCNC: 33 G/DL (ref 33.6–35)
MCV RBC AUTO: 92.2 FL (ref 81.4–97.8)
PLATELET # BLD AUTO: 201 K/UL (ref 164–446)
PMV BLD AUTO: 10.5 FL (ref 9–12.9)
RBC # BLD AUTO: 3.95 M/UL (ref 4.2–5.4)
WBC # BLD AUTO: 14.9 K/UL (ref 4.8–10.8)

## 2018-07-03 PROCEDURE — 85027 COMPLETE CBC AUTOMATED: CPT

## 2018-07-03 PROCEDURE — 770002 HCHG ROOM/CARE - OB PRIVATE (112)

## 2018-07-03 PROCEDURE — A9270 NON-COVERED ITEM OR SERVICE: HCPCS | Performed by: OBSTETRICS & GYNECOLOGY

## 2018-07-03 PROCEDURE — 36415 COLL VENOUS BLD VENIPUNCTURE: CPT

## 2018-07-03 PROCEDURE — 700102 HCHG RX REV CODE 250 W/ 637 OVERRIDE(OP): Performed by: OBSTETRICS & GYNECOLOGY

## 2018-07-03 RX ADMIN — Medication 1 TABLET: at 09:10

## 2018-07-03 RX ADMIN — IBUPROFEN 600 MG: 600 TABLET, FILM COATED ORAL at 23:29

## 2018-07-03 RX ADMIN — IBUPROFEN 600 MG: 600 TABLET, FILM COATED ORAL at 09:16

## 2018-07-03 ASSESSMENT — PAIN SCALES - GENERAL
PAINLEVEL_OUTOF10: 3
PAINLEVEL_OUTOF10: 3

## 2018-07-03 NOTE — CARE PLAN
Problem: Discharge Barriers/Planning  Goal: Patient's continuum of care needs will be met  Pt fundus firm, bleeding light, vitals WDL. Will continue to monitor.     Problem: Potential for postpartum infection related to presence of episiotomy/vaginal tear and/or uterine contamination  Goal: Patient will be absent from signs and symptoms of infection  No signs of infection at this time.

## 2018-07-03 NOTE — PROGRESS NOTES
"Mother speaks Mandrin Chinese. FOB reports will return to [place] in 2 months. Ipad  used to translate. Baby is 35.4 weeks LPI. Mother reports baby not latching on right breast, \"no milk in breast\". Educated on breast massage & hand expression, pin point drop expressed. Reinforced breastfeeding plan from previous Lactation consultant: breastfeeding/attempt, supplement using donor milk according to \"Supplemental guidelines\" then pump & hand express, every 2-3 hours. Encouraged mother to breastfeed/attempt for 15 minutes if no latch then supplement. Pump settings reviewed speed 80/60, suction 30% x 15 minutes. Discussed feeding back pumped breast milk at next feed (BM may stay at room temp for 4 hours). NB booklet given.      Teaching on hunger cues, breastfeeding, supplementing, pumping & hand expressing every 2-3 hours (LPI), cluster feeding, importance of skin to skin, getting baby to open wide for deep latch. Parents plan to rent HG pump through [facility] for home. Encouraged parents to follow-up through [facility] when discharged for any lactation needs, info sheet provided.     Breastfeeding POC:  Breastfeed/attempt, supplement (liquid formula when home), pump & hand express, every 2-3 hours.     "

## 2018-07-03 NOTE — CARE PLAN
Problem: Altered physiologic condition related to immediate post-delivery state and potential for bleeding/hemorrhage  Goal: Patient physiologically stable as evidenced by normal lochia, palpable uterine involution and vital signs within normal limits  Outcome: PROGRESSING AS EXPECTED  Fundus firm; lochia light rubra.     Problem: Alteration in comfort related to episiotomy, vaginal repair and/or after birth pains  Goal: Patient is able to ambulate, care for self and infant  Outcome: PROGRESSING AS EXPECTED  Patient states pain 3/10. Provided with ibuprofen. Will request medication when needed.

## 2018-07-03 NOTE — PROGRESS NOTES
Name:  Felicity   ID Number:  [id number]    Content Discussed:  Report received at 0700. Assessment completed: VSS. Patient ambulates by self. Fundus firm, lochia light rubra. Patient states pain 3/10; medicated per MAR. Patient would like medication offered when available. Bed in lowest locked position. Call light in place. All questions and concerns addressed. Will continue to monitor.

## 2018-07-03 NOTE — CONSULTS
Lactation note:    Initial visit.  Discussed breastfeeding the LPI, and what to watch out for during feedings. Reviewed [facility] LPI handout.     Discussed normal course of breastfeeding and what to expect at 12-24-48-72 hours. Encouraged frequent skin to skin, and to continue offering breast every 2-3 hours.    Plan for tonight is to continue to offer breast first, to avoid feeds longer than 30 minutes, then to supplement according to appropriate guidelines, using 10-20-30 supplementation.     MOB has no other questions or concerns regarding breastfeeding. Encouraged to call for assistance as needed.

## 2018-07-03 NOTE — PROGRESS NOTES
Pt arrived to the unit at 2030 with FOB and CNA from L&D. Report given over the phone. Bands checked with RN and CNA. Pt denies pain at this time. Fluids running as ordered. Assessment complete. Pt and FOB oriented to room and to unit. Educated pt and FOB on educational videos. Pt and FOB speak English but prefer Chinese.

## 2018-07-03 NOTE — PROGRESS NOTES
1900- Report from EMERSON Foreman RN    2000- Report called to REJI Rodas RN, pt transferred to PPU via WC by unit CNA

## 2018-07-03 NOTE — H&P
REASON FOR ADMISSION:  Spontaneous rupture of membranes and  labor.    HISTORY OF PRESENT ILLNESS:  This is a 32-year-old  1, para 0, working   LAVELL of 2018, admitted today with spontaneous rupture of membranes at 35   weeks and 3 days.  Patient was checked by labor and delivery nurse with   obvious rupture of membranes and was 5 cm dilated, vertex presentation.    Patient was chanel spontaneously, approximately every 3-4 minutes.    PRENATAL COURSE:  Patient has been under our care for the pregnancy and was   previously admitted on  for  labor.  Patient did receive   corticosteroids at that time.  Patient was able to be discharged at   approximately 3 cm dilatation, 50% effaced, -2 station at that time.  Patient   was suspected of glucose intolerance; however, glucose did normalize.  Patient   was able to be discharged at this time and was being followed as an   outpatient; however, presented in spontaneous labor.    PAST MEDICAL HISTORY:  She denies any major medical problems with asthma,   seizures, hypertension, cardiovascular, GI or  diseases.    OPERATIONS:  None.    TRANSFUSIONS:  None.    ALLERGIES:  None.    MEDICATIONS:  None.    HABITS:  None.    VENEREAL DISEASE HISTORY:  Negative.    PHYSICAL EXAMINATION:  GENERAL:  Well-developed, well-nourished female, alert and oriented x3, in   mild discomfort. The patient is moderately uncomfortable.  The patient is in   active labor.  HEAD:  Normocephalic.  EYES:  No scleral icterus or subconjunctival pallor.  Pupils are equal,   reactive to light and accommodation.  Extraocular movements are symmetrical.  EAR, NOSE AND THROAT:  Grossly within normal limits.  EXTREMITIES:  No edema or varicosities.  NEUROLOGICAL:  Cranial nerves II-XII grossly intact.    ASSESSMENT:  1.  Intrauterine pregnancy at 35 weeks and 3 days.  2.  Spontaneous rupture of membranes.  3.   labor.    PLAN:  Anticipate normal spontaneous vaginal  delivery.  Patient has been   counseled on the situation and plan to proceed with delivery at this point.    Patient's blood type is noted to be AB positive.  Rubella status is immune.    Hepatitis B surface antigen is negative.  HIV status is negative.      Time:  45 minutes         ____________________________________     MD REBECCA AYERS / STEVE    DD:  07/02/2018 18:39:37  DT:  07/02/2018 19:47:23    D#:  [id number]  Job#:  [id number]

## 2018-07-03 NOTE — PROGRESS NOTES
Reviewed with pt and FOB plan for feeding their at risk NB. They stated that they understood. Pumping and supplementing is starting. NB will also be having his blood sugar checked as well, pt and FOB stated understanding.

## 2018-07-03 NOTE — DELIVERY NOTE
Pt has normal progression and delivered a liveborn male infant, Apgar 8/9, weight: 2455 gm.  There was a spontaneous left vaginal laceration, superficial.  AMTSL initiated and delayed cord clamping at 1 minute.  Spontaneous delivery of the placenta, intact with 3 vessels.  Cervix visually inspected and intact.  Left vaginal laceration repaired with local anesthetic 1% xylocaine, 3-0 Vicryl.  Right periurethral superficial tear repaired with 3-0 Vicryl.   ml.

## 2018-07-03 NOTE — PROGRESS NOTES
1315: Assumed care of pt. AAO, pt using B/R techniques through UC, POC discussed, pt and family verbalized understanding  1633: , viable male per Dr. Valerio, baby with 8/9 APGARS, baby skin to skin with pt.

## 2018-07-03 NOTE — OP REPORT
DATE OF SERVICE:  2018    DELIVERY NOTE    PREDELIVERY DIAGNOSES:  1.  Intrauterine pregnancy at 35 weeks and 3 days.  2.  Spontaneous rupture of membranes.  3.   labor.    POSTDELIVERY DIAGNOSES:  1.  Intrauterine pregnancy at 35 weeks and 3 days.  2.  Spontaneous rupture of membranes.  3.   labor.    PROCEDURE:  Normal spontaneous vaginal delivery with repair of spontaneous   vaginal laceration.    COMPLICATIONS:  None.    ANESTHESIA:  1%  Xylocaine, local.    DELIVERY FINDINGS:  Live born male infant 2455 g, Apgars 8 and 9, normal   spontaneous vaginal delivery.    DESCRIPTION OF PROCEDURE:  The patient achieved complete dilatation and was   allowed to actively push.  She had normal progression of labor and was   spontaneously delivered a live born male infant 2455 g, Apgars were 8 and 9.    There was a superficial left lateral vaginal laceration was repaired with 3-0   Vicryl under local anesthetic and there was a right periurethral which was   reapproximated with interrupted suture of 3-0 Vicryl.  Patient had active   management of third stage of labor.  Placental delivery was spontaneous intact   with 3 vessels and the cervix was intact.  There was adequate uterine tone   post-procedure, patient was in recovery in stable condition.       ____________________________________     MD REBECCA AYERS / STEVE    DD:  2018 18:45:31  DT:  2018 19:37:39    D#:  [id number]  Job#:  [id number]

## 2018-07-04 VITALS
SYSTOLIC BLOOD PRESSURE: 101 MMHG | TEMPERATURE: 97.6 F | WEIGHT: 120 LBS | OXYGEN SATURATION: 95 % | HEIGHT: 61 IN | DIASTOLIC BLOOD PRESSURE: 62 MMHG | BODY MASS INDEX: 22.66 KG/M2 | RESPIRATION RATE: 18 BRPM | HEART RATE: 77 BPM

## 2018-07-04 PROCEDURE — A9270 NON-COVERED ITEM OR SERVICE: HCPCS | Performed by: OBSTETRICS & GYNECOLOGY

## 2018-07-04 PROCEDURE — 700102 HCHG RX REV CODE 250 W/ 637 OVERRIDE(OP): Performed by: OBSTETRICS & GYNECOLOGY

## 2018-07-04 PROCEDURE — 700112 HCHG RX REV CODE 229: Performed by: OBSTETRICS & GYNECOLOGY

## 2018-07-04 RX ADMIN — IBUPROFEN 600 MG: 600 TABLET, FILM COATED ORAL at 08:30

## 2018-07-04 RX ADMIN — DOCUSATE SODIUM 100 MG: 100 CAPSULE ORAL at 14:32

## 2018-07-04 RX ADMIN — Medication 1 TABLET: at 08:30

## 2018-07-04 ASSESSMENT — PAIN SCALES - GENERAL
PAINLEVEL_OUTOF10: 0
PAINLEVEL_OUTOF10: 1

## 2018-07-04 NOTE — CARE PLAN
Problem: Discharge Barriers/Planning  Goal: Patient's continuum of care needs will be met  Pt fundus firm, bleeding light, vitals WDL. Will continue to monitor.     Problem: Pain Management  Goal: Pain level will decrease to patient's comfort goal  Will medicate for pain PRN see MAR

## 2018-07-04 NOTE — PROGRESS NOTES
Mother reports baby has been breastfeeding well, mother has been working breastfeeding plan; breastfeeding, supplementing & pumping. Reinforced supplementing using guideline volumes after every breastfeed, every 2-3 hours. Mother picked-up HG pump through [facility] yesterday. Baby has a Pediatrician appointment tomorrow. Encouraged mother to follow-up through [facility] for any lactation needs.

## 2018-07-04 NOTE — DISCHARGE PLANNING
Anticipated Discharge Disposition: Home    Action: LSW was notified MOB and FOB would like a pediatrician list. LSW provided list and dicussed options for private pay. MOB and FOB are from [place] and were here visiting family. Pt wasn't due until August so they were no expecting her to go into labor. No other needs at this time.    Barriers to Discharge: Npne    Plan: LSW signing off

## 2018-07-04 NOTE — DISCHARGE INSTRUCTIONS
POSTPARTUM DISCHARGE INSTRUCTIONS FOR MOM    YOB: 1985   Age: 32 y.o.               Admit Date: 7/2/2018     Discharge Date: 7/4/2018  Attending Doctor:  Reymundo Valerio M.D.                  Allergies:  Patient has no known allergies.    Discharged to home by car. Discharged via wheelchair, hospital escort: Yes.  Special equipment needed: Not Applicable  Belongings with: Personal  Be sure to schedule a follow-up appointment with your primary care doctor or any specialists as instructed.     Discharge Plan:   Diet Plan: Discussed  Activity Level: Discussed  Confirmed Follow up Appointment: Patient to Call and Schedule Appointment  Confirmed Symptoms Management: Discussed  Medication Reconciliation Updated: Yes  Influenza Vaccine Indication: Indicated: Not available from distributor/    REASONS TO CALL YOUR OBSTETRICIAN:  1.   Persistent fever or shaking chills (Temperature higher than 100.4)  2.   Heavy bleeding (soaking more than 1 pad per hour); Passing clots  3.   Foul odor from vagina  4.   Mastitis (Breast infection; breast pain, chills, fever, redness)  5.   Urinary pain, burning or frequency    8.   Severe depression longer than 24 hours    HAND WASHING  · Prior to handling the baby.  · Before breastfeeding or bottle feeding baby.  · After using the bathroom or changing the baby's diaper.    VAGINAL CARE  · Nothing inside vagina for 6 weeks: no sexual intercourse, tampons or douching.  · Bleeding may continue for 2-4 weeks.  Amount may vary.    · Call your physician for heavy bleeding which means soaking more than 1 pad per hour    BIRTH CONTROL  · It is possible to become pregnant at any time after delivery and while breastfeeding.  · Plan to discuss a method of birth control with your physician at your follow up visit. visit.    DIET AND ELIMINATION  · Eating more fiber (bran cereal, fruits, and vegetables) and drinking plenty of fluids will help to avoid constipation.  · Urinary  "frequency after childbirth is normal.    POSTPARTUM BLUES  During the first few days after birth, you may experience a sense of the \"blues\" which may include impatience, irritability or even crying.  These feeling come and go quickly.  However, as many as 1 in 10 women experience emotional symptoms known as postpartum depression.    Postpartum depression:  May start as early as the second or third day after delivery or take several weeks or months to develop.  Symptoms of \"blues\" are present, but are more intense:  Crying spells; loss of appetite; feelings of hopelessness or loss of control; fear of touching the baby; over concern or no concern at all about the baby; little or no concern about your own appearance/caring for yourself; and/or inability to sleep or excessive sleeping.  Contact your physician if you are experiencing any of these symptoms.    Crisis Hotline:  · [place] Crisis Hotline:  5-598-QRTAYSX  Or 1-996.389.6651  · [place] Crisis Hotline:  1-794.230.9971  Or 063-532-0926    PREVENTING SHAKEN BABY:  If you are angry or stressed, PUT THE BABY IN THE CRIB, step away, take some deep breaths, and wait until you are calm to care for the baby.  DO NOT SHAKE THE BABY.  You are not alone, call a supporter for help.    · Crisis Call Center 24/7 crisis line 423-383-3601 or 1-943.972.4283  · You can also text them, text \"ANSWER\" to 887983    QUIT SMOKING/TOBACCO USE:  I understand the use of any tobacco products increases my chance of suffering from future heart disease and could cause other illnesses which may shorten my life. Quitting the use of tobacco products is the single most important thing I can do to improve my health. For further information on smoking / tobacco cessation call a Toll Free Quit Line at 1-580.121.8694 (*[employer] [place]) or 1-481.165.5556 (American Lung Association) or you can access the web based program at www.lungusa.org.    · [place] Tobacco Users Help Line:  (113) " 739-9683       Toll Free: 9-062-830-7872  · Quit Tobacco Program [facility] (936)600-3052    DEPRESSION / SUICIDE RISK:  As you are discharged from this [facility] facility, it is important to learn how to keep safe from harming yourself.    Recognize the warning signs:  · Abrupt changes in personality, positive or negative- including increase in energy   · Giving away possessions  · Change in eating patterns- significant weight changes-  positive or negative  · Change in sleeping patterns- unable to sleep or sleeping all the time   · Unwillingness or inability to communicate  · Depression  · Unusual sadness, discouragement and loneliness  · Talk of wanting to die  · Neglect of personal appearance   · Rebelliousness- reckless behavior  · Withdrawal from people/activities they love  · Confusion- inability to concentrate     If you or a loved one observes any of these behaviors or has concerns about self-harm, here's what you can do:  · Talk about it- your feelings and reasons for harming yourself  · Remove any means that you might use to hurt yourself (examples: pills, rope, extension cords, firearm)  · Get professional help from the community (Mental Health, Substance Abuse, psychological counseling)  · Do not be alone:Call your Safe Contact- someone whom you trust who will be there for you.  · Call your local CRISIS HOTLINE 712-2675 or 969-747-3319  · Call your local Children's Mobile Crisis Response Team [place] (277) 152-0759 or www.Flattr  · Call the toll free National Suicide Prevention Hotlines   · National Suicide Prevention Lifeline 037-119-KQLG (3793)  · National Hope Line Network 800-SUICIDE (388-5528)    DISCHARGE SURVEY:  Thank you for choosing [facility].  We hope we provided you with very good care.  You may be receiving a survey in the mail.  Please fill it out.  Your opinion is valuable to us.    ADDITIONAL EDUCATIONAL MATERIALS GIVEN TO PATIENT:        My  signature on this form indicates that:  1.  I have reviewed and understand the above information  2.  My questions regarding this information have been answered to my satisfaction.  3.  I have formulated a plan with my discharge nurse to obtain my prescribed medication for home.

## 2018-07-04 NOTE — PROGRESS NOTES
S:  Mild cramps  O: Afebrile      Fundus: firm      Lochia: rubra  A:  POD 1  P:  Baby being held today.  Will keep mom

## 2018-07-04 NOTE — CARE PLAN
Problem: Alteration in comfort related to episiotomy, vaginal repair and/or after birth pains  Goal: Patient verbalizes acceptable pain level  Outcome: PROGRESSING AS EXPECTED  Patient states pain 1/10. Ibuprofen provided. Plan of care made to keep patient comfortable.     Problem: Potential knowledge deficit related to lack of understanding of self and  care  Goal: Patient will verbalize understanding of self and infant care  Outcome: PROGRESSING AS EXPECTED  Discharge instructions reviewed in native language. All questions and concerns addressed.

## 2018-07-04 NOTE — PROGRESS NOTES
Report received at 0700. Assessment completed: VSS. Patient ambulates by self. Fundus firm, lochia scant. Patient states pain 1/10; medicated per MAR. Patient would like medication offered when available. Bed in lowest locked position. Call light in place. All questions and concerns addressed. Will continue to monitor.

## 2018-07-04 NOTE — PROGRESS NOTES
Assumed care of patient at 1915, received report from day RN at that time. Communication board updated and reviewed with pt and family. Pt denies pain at this time. Assessment complete. No other needs at this time. Call light and personal belongings within reach.

## 2018-07-04 NOTE — DISCHARGE SUMMARY
DATE OF DISCHARGE:  2018    ADMISSION DIAGNOSES:  1.  Intrauterine pregnancy at 35 weeks and 3 days.  2.   rupture of membranes.  3.   labor.    DISCHARGE DIAGNOSES:  1.  Intrauterine pregnancy at 35 weeks and 3 days.  2.   rupture of membranes.  3.   labor.    PROCEDURE RECEIVED:  Normal spontaneous vaginal delivery.    COMPLICATIONS:  None.    BRIEF HISTORY:  A 32-year-old  1, para 0, EDC 2018, admitted at   35 weeks and 3 days with spontaneous rupture of membranes on .    The patient was found to be 5 cm dilated.    HOSPITAL COURSE:  The patient received GBS prophylaxis due to less than 37   weeks and proceeded her own spontaneous labor, ultimately delivering a live   born male infant, Apgars 8 and 9, weighing 2455 g.  There was a left vaginal   laceration, which was repaired with 3-0 Vicryl.  Postpartum, the patient had a   benign course, she remained therefore febrile.  She is Rh positive and her   discharge hematocrit is 36.4%.    CONDITION ON DISCHARGE:  Satisfactory.    DISPOSITION:  Discharge to home.  Follow up with us in 1 week.    PROGNOSIS:  Favorable.  The patient should be considering increased risk for    birth in future pregnancies.    RECOMMENDATIONS:  None.    MEDICATIONS:  Over-the-counter ibuprofen.       ____________________________________     MD REBECCA AYERS / STEVE    DD:  2018 12:59:44  DT:  2018 13:07:45    D#:  [id number]  Job#:  [id number]

## 2018-07-11 ENCOUNTER — HOSPITAL ENCOUNTER (OUTPATIENT)
Facility: MEDICAL CENTER | Age: 33
DRG: 769 | End: 2018-07-11
Admitting: OBSTETRICS & GYNECOLOGY
Payer: OTHER MISCELLANEOUS

## 2018-07-11 ENCOUNTER — HOSPITAL ENCOUNTER (INPATIENT)
Facility: MEDICAL CENTER | Age: 33
LOS: 3 days | DRG: 769 | End: 2018-07-14
Attending: OBSTETRICS & GYNECOLOGY | Admitting: OBSTETRICS & GYNECOLOGY
Payer: OTHER MISCELLANEOUS

## 2018-07-11 LAB
ALBUMIN SERPL BCP-MCNC: 3.6 G/DL (ref 3.2–4.9)
ALBUMIN/GLOB SERPL: 1.2 G/DL
ALP SERPL-CCNC: 99 U/L (ref 30–99)
ALT SERPL-CCNC: 10 U/L (ref 2–50)
ANION GAP SERPL CALC-SCNC: 11 MMOL/L (ref 0–11.9)
AST SERPL-CCNC: 11 U/L (ref 12–45)
BASOPHILS # BLD AUTO: 0.5 % (ref 0–1.8)
BASOPHILS # BLD: 0.06 K/UL (ref 0–0.12)
BILIRUB SERPL-MCNC: 0.3 MG/DL (ref 0.1–1.5)
BUN SERPL-MCNC: 10 MG/DL (ref 8–22)
CALCIUM SERPL-MCNC: 9 MG/DL (ref 8.5–10.5)
CHLORIDE SERPL-SCNC: 107 MMOL/L (ref 96–112)
CO2 SERPL-SCNC: 19 MMOL/L (ref 20–33)
CREAT SERPL-MCNC: 0.51 MG/DL (ref 0.5–1.4)
EOSINOPHIL # BLD AUTO: 0.11 K/UL (ref 0–0.51)
EOSINOPHIL NFR BLD: 1 % (ref 0–6.9)
ERYTHROCYTE [DISTWIDTH] IN BLOOD BY AUTOMATED COUNT: 45.6 FL (ref 35.9–50)
GLOBULIN SER CALC-MCNC: 3 G/DL (ref 1.9–3.5)
GLUCOSE SERPL-MCNC: 106 MG/DL (ref 65–99)
HCT VFR BLD AUTO: 40.5 % (ref 37–47)
HGB BLD-MCNC: 12.7 G/DL (ref 12–16)
IMM GRANULOCYTES # BLD AUTO: 0.04 K/UL (ref 0–0.11)
IMM GRANULOCYTES NFR BLD AUTO: 0.4 % (ref 0–0.9)
LYMPHOCYTES # BLD AUTO: 2.03 K/UL (ref 1–4.8)
LYMPHOCYTES NFR BLD: 18.6 % (ref 22–41)
MCH RBC QN AUTO: 30.1 PG (ref 27–33)
MCHC RBC AUTO-ENTMCNC: 31.4 G/DL (ref 33.6–35)
MCV RBC AUTO: 96 FL (ref 81.4–97.8)
MONOCYTES # BLD AUTO: 0.82 K/UL (ref 0–0.85)
MONOCYTES NFR BLD AUTO: 7.5 % (ref 0–13.4)
NEUTROPHILS # BLD AUTO: 7.87 K/UL (ref 2–7.15)
NEUTROPHILS NFR BLD: 72 % (ref 44–72)
NRBC # BLD AUTO: 0 K/UL
NRBC BLD-RTO: 0 /100 WBC
PLATELET # BLD AUTO: 337 K/UL (ref 164–446)
PMV BLD AUTO: 10 FL (ref 9–12.9)
POTASSIUM SERPL-SCNC: 3.5 MMOL/L (ref 3.6–5.5)
PROT SERPL-MCNC: 6.6 G/DL (ref 6–8.2)
RBC # BLD AUTO: 4.22 M/UL (ref 4.2–5.4)
SODIUM SERPL-SCNC: 137 MMOL/L (ref 135–145)
WBC # BLD AUTO: 10.9 K/UL (ref 4.8–10.8)

## 2018-07-11 PROCEDURE — 81001 URINALYSIS AUTO W/SCOPE: CPT

## 2018-07-11 PROCEDURE — 700111 HCHG RX REV CODE 636 W/ 250 OVERRIDE (IP): Performed by: OBSTETRICS & GYNECOLOGY

## 2018-07-11 PROCEDURE — 87086 URINE CULTURE/COLONY COUNT: CPT

## 2018-07-11 PROCEDURE — 700105 HCHG RX REV CODE 258: Performed by: OBSTETRICS & GYNECOLOGY

## 2018-07-11 PROCEDURE — 85025 COMPLETE CBC W/AUTO DIFF WBC: CPT

## 2018-07-11 PROCEDURE — 80053 COMPREHEN METABOLIC PANEL: CPT

## 2018-07-11 PROCEDURE — 87040 BLOOD CULTURE FOR BACTERIA: CPT

## 2018-07-11 PROCEDURE — 36415 COLL VENOUS BLD VENIPUNCTURE: CPT

## 2018-07-11 PROCEDURE — 770002 HCHG ROOM/CARE - OB PRIVATE (112)

## 2018-07-11 RX ORDER — AMPICILLIN 2 G/1
2 INJECTION, POWDER, FOR SOLUTION INTRAVENOUS EVERY 4 HOURS
Status: DISCONTINUED | OUTPATIENT
Start: 2018-07-11 | End: 2018-07-12 | Stop reason: ALTCHOICE

## 2018-07-11 RX ORDER — ACETAMINOPHEN 325 MG/1
650 TABLET ORAL EVERY 6 HOURS PRN
Status: DISCONTINUED | OUTPATIENT
Start: 2018-07-11 | End: 2018-07-14 | Stop reason: HOSPADM

## 2018-07-11 RX ORDER — CLINDAMYCIN PHOSPHATE 900 MG/50ML
900 INJECTION, SOLUTION INTRAVENOUS EVERY 12 HOURS
Status: DISCONTINUED | OUTPATIENT
Start: 2018-07-11 | End: 2018-07-14 | Stop reason: HOSPADM

## 2018-07-11 RX ORDER — SODIUM CHLORIDE, SODIUM LACTATE, POTASSIUM CHLORIDE, CALCIUM CHLORIDE 600; 310; 30; 20 MG/100ML; MG/100ML; MG/100ML; MG/100ML
INJECTION, SOLUTION INTRAVENOUS CONTINUOUS
Status: DISCONTINUED | OUTPATIENT
Start: 2018-07-11 | End: 2018-07-14 | Stop reason: HOSPADM

## 2018-07-11 RX ORDER — IBUPROFEN 600 MG/1
600 TABLET ORAL EVERY 8 HOURS PRN
Status: DISCONTINUED | OUTPATIENT
Start: 2018-07-11 | End: 2018-07-14 | Stop reason: HOSPADM

## 2018-07-11 RX ADMIN — SODIUM CHLORIDE, POTASSIUM CHLORIDE, SODIUM LACTATE AND CALCIUM CHLORIDE: 600; 310; 30; 20 INJECTION, SOLUTION INTRAVENOUS at 23:51

## 2018-07-11 RX ADMIN — AMPICILLIN SODIUM 2 G: 2 INJECTION, POWDER, FOR SOLUTION INTRAMUSCULAR; INTRAVENOUS at 23:51

## 2018-07-11 ASSESSMENT — PAIN SCALES - GENERAL: PAINLEVEL_OUTOF10: 3

## 2018-07-12 LAB
APPEARANCE UR: CLEAR
BACTERIA #/AREA URNS HPF: ABNORMAL /HPF
BILIRUB UR QL STRIP.AUTO: NEGATIVE
COLOR UR: YELLOW
EPI CELLS #/AREA URNS HPF: NEGATIVE /HPF
GLUCOSE UR STRIP.AUTO-MCNC: NEGATIVE MG/DL
KETONES UR STRIP.AUTO-MCNC: NEGATIVE MG/DL
LEUKOCYTE ESTERASE UR QL STRIP.AUTO: ABNORMAL
MICRO URNS: ABNORMAL
NITRITE UR QL STRIP.AUTO: NEGATIVE
PH UR STRIP.AUTO: 7 [PH]
PROT UR QL STRIP: NEGATIVE MG/DL
RBC # URNS HPF: ABNORMAL /HPF
RBC UR QL AUTO: ABNORMAL
SP GR UR STRIP.AUTO: 1
UROBILINOGEN UR STRIP.AUTO-MCNC: 0.2 MG/DL
WBC #/AREA URNS HPF: ABNORMAL /HPF

## 2018-07-12 PROCEDURE — A9270 NON-COVERED ITEM OR SERVICE: HCPCS | Performed by: OBSTETRICS & GYNECOLOGY

## 2018-07-12 PROCEDURE — 770002 HCHG ROOM/CARE - OB PRIVATE (112)

## 2018-07-12 PROCEDURE — 700101 HCHG RX REV CODE 250: Performed by: OBSTETRICS & GYNECOLOGY

## 2018-07-12 PROCEDURE — 700102 HCHG RX REV CODE 250 W/ 637 OVERRIDE(OP): Performed by: OBSTETRICS & GYNECOLOGY

## 2018-07-12 PROCEDURE — 700112 HCHG RX REV CODE 229: Performed by: ADVANCED PRACTICE MIDWIFE

## 2018-07-12 PROCEDURE — A9270 NON-COVERED ITEM OR SERVICE: HCPCS | Performed by: ADVANCED PRACTICE MIDWIFE

## 2018-07-12 PROCEDURE — 700102 HCHG RX REV CODE 250 W/ 637 OVERRIDE(OP): Performed by: ADVANCED PRACTICE MIDWIFE

## 2018-07-12 PROCEDURE — 88305 TISSUE EXAM BY PATHOLOGIST: CPT

## 2018-07-12 PROCEDURE — 700105 HCHG RX REV CODE 258: Performed by: OBSTETRICS & GYNECOLOGY

## 2018-07-12 PROCEDURE — 700111 HCHG RX REV CODE 636 W/ 250 OVERRIDE (IP): Performed by: OBSTETRICS & GYNECOLOGY

## 2018-07-12 RX ORDER — DOCUSATE SODIUM 100 MG/1
100 CAPSULE, LIQUID FILLED ORAL 2 TIMES DAILY
Status: DISCONTINUED | OUTPATIENT
Start: 2018-07-12 | End: 2018-07-14 | Stop reason: HOSPADM

## 2018-07-12 RX ORDER — VITAMIN A ACETATE, BETA CAROTENE, ASCORBIC ACID, CHOLECALCIFEROL, .ALPHA.-TOCOPHEROL ACETATE, DL-, THIAMINE MONONITRATE, RIBOFLAVIN, NIACINAMIDE, PYRIDOXINE HYDROCHLORIDE, FOLIC ACID, CYANOCOBALAMIN, CALCIUM CARBONATE, FERROUS FUMARATE, ZINC OXIDE, CUPRIC OXIDE 3080; 12; 120; 400; 1; 1.84; 3; 20; 22; 920; 25; 200; 27; 10; 2 [IU]/1; UG/1; MG/1; [IU]/1; MG/1; MG/1; MG/1; MG/1; MG/1; [IU]/1; MG/1; MG/1; MG/1; MG/1; MG/1
1 TABLET, FILM COATED ORAL EVERY MORNING
Status: DISCONTINUED | OUTPATIENT
Start: 2018-07-13 | End: 2018-07-14 | Stop reason: HOSPADM

## 2018-07-12 RX ORDER — METHYLERGONOVINE MALEATE 0.2 MG/1
0.2 TABLET ORAL EVERY 6 HOURS
Status: DISCONTINUED | OUTPATIENT
Start: 2018-07-12 | End: 2018-07-13

## 2018-07-12 RX ADMIN — AMPICILLIN SODIUM 2000 MG: 2 INJECTION, POWDER, FOR SOLUTION INTRAMUSCULAR; INTRAVENOUS at 20:38

## 2018-07-12 RX ADMIN — DOCUSATE SODIUM 100 MG: 100 CAPSULE ORAL at 12:07

## 2018-07-12 RX ADMIN — CLINDAMYCIN IN 5 PERCENT DEXTROSE 900 MG: 18 INJECTION, SOLUTION INTRAVENOUS at 12:35

## 2018-07-12 RX ADMIN — METHYLERGONOVINE MALEATE 0.2 MG: 0.2 TABLET ORAL at 17:58

## 2018-07-12 RX ADMIN — AMPICILLIN SODIUM 2 G: 2 INJECTION, POWDER, FOR SOLUTION INTRAMUSCULAR; INTRAVENOUS at 12:35

## 2018-07-12 RX ADMIN — AMPICILLIN SODIUM 2 G: 2 INJECTION, POWDER, FOR SOLUTION INTRAMUSCULAR; INTRAVENOUS at 08:57

## 2018-07-12 RX ADMIN — SODIUM CHLORIDE, POTASSIUM CHLORIDE, SODIUM LACTATE AND CALCIUM CHLORIDE: 600; 310; 30; 20 INJECTION, SOLUTION INTRAVENOUS at 12:06

## 2018-07-12 RX ADMIN — CLINDAMYCIN IN 5 PERCENT DEXTROSE 900 MG: 18 INJECTION, SOLUTION INTRAVENOUS at 00:41

## 2018-07-12 RX ADMIN — GENTAMICIN SULFATE 240 MG: 40 INJECTION, SOLUTION INTRAMUSCULAR; INTRAVENOUS at 02:09

## 2018-07-12 RX ADMIN — AMPICILLIN SODIUM 2 G: 2 INJECTION, POWDER, FOR SOLUTION INTRAMUSCULAR; INTRAVENOUS at 03:39

## 2018-07-12 RX ADMIN — IBUPROFEN 600 MG: 600 TABLET, FILM COATED ORAL at 17:44

## 2018-07-12 RX ADMIN — METHYLERGONOVINE MALEATE 0.2 MG: 0.2 TABLET ORAL at 12:02

## 2018-07-12 ASSESSMENT — PAIN SCALES - GENERAL
PAINLEVEL_OUTOF10: 0
PAINLEVEL_OUTOF10: 2

## 2018-07-12 NOTE — PROGRESS NOTES
Assessment done, lochia light.  Vital signs stable. IV patent on pump.  Pt denies complaints of pain, see MAR.  Pt states voiding without difficulty.  Reviewed plan of care with pt & encouraged to call with needs.  Call light in place

## 2018-07-12 NOTE — PROGRESS NOTES
Direct Admit from Perinatologist Office. Dr. Valerio is the Referring and Accepting Physician for Endometritis. All signed and held orders will need to be released upon patient arrival. Patient arriving via private vehicle.

## 2018-07-12 NOTE — PROGRESS NOTES
Pharmacy Kinetics 32 y.o. female on gentamicin day # 1 2018    Dosing Weight: 48 kg (ideal)  Currently on Gentamicin 240 mg iv q24hr    Indication for treatment: Endometritis    Pertinent history per medical record: Admitted on 2018 for endometritis.    Other antibiotics: Clindamycin 900 mg q12h, Ampicillin 2 g q4h    Allergies: Patient has no known allergies.     List concerns for renal function: None    Pertinent cultures to date:     Blood culture -- in process    Urine culture -- in process    Recent Labs      18   2307   WBC  10.9*   NEUTSPOLYS  72.00     Recent Labs      18   2307   BUN  10   CREATININE  0.51   ALBUMIN  3.6     No results for input(s): GENTTROUGH, GENTPEAK, GENTRANDOM in the last 72 hours.No intake or output data in the 24 hours ending 18 0046   Blood pressure 109/68, pulse 94, temperature 36.7 °C (98.1 °F), resp. rate 18, weight 51.3 kg (113 lb), SpO2 96 %, currently breastfeeding. Temp (24hrs), Av.7 °C (98.1 °F), Min:36.7 °C (98.1 °F), Max:36.7 °C (98.1 °F)      A/P   1. Gentamicin dose change: New start  2. Next gentamicin level: In 48-72 hours (not ordered)  3. Goal trough: Undetectable   4. Comments: no concerns for renal function.  Will dose 240 mg (5 mg/kg based on dosing weight) q24h per protocol.  Will order a level if therapy continued > 3 days.  Pharmacy will monitor and adjust dosing if needed.    Jae Alonso, PharmD

## 2018-07-12 NOTE — PROGRESS NOTES
Patient called out to RN that she had something on her pad to show me. This RN observed something resembling a strawberry sized blood clot on a white esperanza pad. Dr. Valerio called and orders obtained to place to specimen in saline and keep it cold overnight.

## 2018-07-12 NOTE — H&P
DATE OF ADMISSION:  2018    REASON FOR ADMISSION:  Endometritis, rule out septic thrombophlebitis.    HISTORY OF PRESENT ILLNESS:  This is a 33-year-old  1, now para 1, who   delivered on  after spontaneous rupture of membranes at 35 weeks and 3   days.  Patient was also in spontaneous labor, had a normal spontaneous vaginal   delivery live born male infant, Apgars were 8 and 9, weighing 2455 g.  There   was a left vaginal laceration that was repaired with 3-0 Vicryl.  Postpartum,   she had a benign course, had a discharge hematocrit of 36.4%.  Patient was   seen 1 week post-partum visit and appeared to be doing well, noted some   abdominal cramping and she contacted me this evening with complaints of chills   and low grade fever of 99.8.  Patient was asked to continue monitoring   temperature and she subsequently called back with a temperature of 100.6 and   we recommend that she come for evaluation.  She was seen this evening and was   found to have left lateral tenderness of the uterus and uterine tenderness   suspect endometritis of the left septic pelvic thrombophlebitis.  Patient was   advised indication for admission and antibiotic therapy.  Patient was clearly   having chills.  Patient was therefore admitted for further evaluation and   treatment.    PAST MEDICAL HISTORY:  She denies any major medical problems, asthma,   seizures, hypertension, cardiovascular, GI or  diseases.    OPERATIONS:  None.    TRANSFUSIONS:  None.    ALLERGIES:  None.    MEDICATIONS:  None.    HABITS:  None.    PHYSICAL EXAMINATION:  GENERAL:  Well-developed, well-nourished female, alert and oriented x3 in   moderate discomfort.  HEAD:  Normocephalic.  EYES:  No scleral icterus or subconjunctival pallor.  LUNGS:  Clear.  HEART:  Regular rate and rhythm.  Normal S1 and S2.  No S3, S4 or murmurs.  ABDOMEN:  Soft abdomen.  No rebound, guarding or rigidity.  PELVIC:  Revealed purulent lochia, positive tenderness to  cervical motion.  Uterus   is anteverted, small, tender to palpation and tender to the left adnexa.    ASSESSMENT:  1.  Status post partum day #9.  2.  Probable endometritis, rule out septic left thrombophlebitis.    PLAN:  Patient admitted and will be on triple antibiotics and if no   defervesced 48 hours, we will initiate Lovenox.  Patient counseled on   situation indications and all questions answered to her satisfaction.    Time:  60 minutes     ____________________________________     MD REBECCA AYERS / STEVE    DD:  07/11/2018 22:41:46  DT:  07/11/2018 22:54:56    D#:  [id number]  Job#:  [id number]

## 2018-07-12 NOTE — PROGRESS NOTES
Postpartum day #10, Febrile, Uterine tenderness; probably endometritis due to retained products.     S: Feels better today. No chills since last evening. Passed about a elio sized black piece of tissue and pain has been much better since. Bleeding has been light, but she reported passing a small blood clot after passing tissue.     O: Afebrile, BP 90's to low 100s over 60s to 70s; Pulse 70-low 100s       Tissue observed that appears to be placental tissue about the size of a small elio       On triple antibiotics IV    A: Probable endometritis due to retained products      Afebrile       Triple antibiotics    P: Con't antibiotics        Methergine 0.2. Mg PO q 6 hours x 6 dose       Monitor temps       Colace for constipation       Send specimen to path.        Possible D/C home tomorrow     Total time: 15 minutes

## 2018-07-12 NOTE — PROGRESS NOTES
Pharmacy Kinetics 32 y.o. female on gentamicin day # 1  2018    Dosing Weight: 48 kg  Currently on Gentamicin 240 mg iv q24hr    Indication for treatment: endometritis, rule out septic thrombophlebitis    Pertinent history per medical record: Admitted on 2018 for possible endometritis, possible septic thrombophlebitis.  Delivered 2018 and now returns febrile with chills.    Other antibiotics: Clindamycin 900 mg IV q 12 hours, Ampicillin 2 g IV q 4 hours    Allergies: Patient has no known allergies.     List concerns for renal function: none    Pertinent cultures to date:   None reporting in house.    Recent Labs      18   2307   WBC  10.9*   NEUTSPOLYS  72.00     Recent Labs      18   2307   BUN  10   CREATININE  0.51   ALBUMIN  3.6     No results for input(s): GENTTROUGH, GENTPEAK, GENTRANDOM in the last 72 hours.No intake or output data in the 24 hours ending 18 1018   Blood pressure (!) 94/74, pulse 94, temperature 36.1 °C (97 °F), resp. rate 18, weight 51.3 kg (113 lb), SpO2 96 %, currently breastfeeding. Temp (24hrs), Av.6 °C (97.9 °F), Min:36.1 °C (97 °F), Max:37 °C (98.6 °F)      A/P   1. Gentamicin dose change: started per protocol  2. Next gentamicin level: will order if cont'd > 48 hours  3. Goal trough: < 0.18 mcg/mL  4. Comments: I/O data incomplete.  Renal labs ok.  Voiding without problem per RN.  Monitor.    TRAVIS Watts, PharmD, BCPS

## 2018-07-12 NOTE — PROGRESS NOTES
Patient self presented to [facility] with spouse and aunt with written orders in hand. Assessment complete. Patient provided with esperanza pads and reoriented to unit. All questions answered.

## 2018-07-12 NOTE — PROGRESS NOTES
"Mother readmitted for infection with hx of retained placenta which was removed in doctor's office but she also passed some tissue today. Baby accompanied parents and is rooming in for feedings. Saw Mom/baby due to the fact that baby was born at 35.3 wks gestation, went home on a feeding plan, breastfeeding, supplementing and Mom pumping afterward. This is their first baby. Baby is now 36.5 weeks. Parents state that baby is nursing \"very well\" and mother is pumping \"a lot of milk\"  and they want to stop supplementing because baby prefers the bottle to breast feeding.    Concerns are: Mom c/o full painful full milk ducts before and after breast nursing baby. She pumped approx 50 mls after breast feeding and milk ducts aren't felt after pumping. It seems baby isn't emptying her breasts very well.  Due to the retained placenta does mother really have a good supply?  Plan is to weigh baby before and after the next feeding to see how much milk baby is transferring and then give them that information so they can confer with PEDs to make a decision about continuing supplementation. Parents have been waking baby up Q 2hrs for feedings since birth.    1330-Called to bedside to observe feeding. Weighed baby before feeding=2386 gms. Baby latched and nursed for 3 minutes and fell asleep. Reminded mother that baby only nursed for 3 minutes.She  relatched him after a few minutes and as he nursed she massaged her breast to move milk into his mouth. Sucking was regular. Baby has a hard time latching past the nipple since his mouth is small. In total baby nursed for about 25 min. Post feed weight= 2412gms. Educated them that feed needs to be limited to 30 min total for breast and bottle feeding so he won't burn more calories than he is taking in.  Explained that he will still need to be supplemented because even if he ate 12 times/24 hrs that amt =about 12 oz when he needs 15 to 16 oz/24 hrs to gain weight. She got weepy so I went " over the characteristics of the Late  baby and that her goals are to keep baby fed and protect her milk supply so that when baby can really nurse well she will have milk for him. I asked her to get in touch with her Peds tomorrow to clarify her feeding plan. She has a Peds appt next Tues.

## 2018-07-13 PROCEDURE — 700105 HCHG RX REV CODE 258: Performed by: OBSTETRICS & GYNECOLOGY

## 2018-07-13 PROCEDURE — 700111 HCHG RX REV CODE 636 W/ 250 OVERRIDE (IP)

## 2018-07-13 PROCEDURE — 160002 HCHG RECOVERY MINUTES (STAT): Performed by: OBSTETRICS & GYNECOLOGY

## 2018-07-13 PROCEDURE — 160048 HCHG OR STATISTICAL LEVEL 1-5: Performed by: OBSTETRICS & GYNECOLOGY

## 2018-07-13 PROCEDURE — 160028 HCHG SURGERY MINUTES - 1ST 30 MINS LEVEL 3: Performed by: OBSTETRICS & GYNECOLOGY

## 2018-07-13 PROCEDURE — 88305 TISSUE EXAM BY PATHOLOGIST: CPT | Mod: 59

## 2018-07-13 PROCEDURE — 770002 HCHG ROOM/CARE - OB PRIVATE (112)

## 2018-07-13 PROCEDURE — 700102 HCHG RX REV CODE 250 W/ 637 OVERRIDE(OP): Performed by: OBSTETRICS & GYNECOLOGY

## 2018-07-13 PROCEDURE — 160035 HCHG PACU - 1ST 60 MINS PHASE I: Performed by: OBSTETRICS & GYNECOLOGY

## 2018-07-13 PROCEDURE — A9270 NON-COVERED ITEM OR SERVICE: HCPCS | Performed by: ADVANCED PRACTICE MIDWIFE

## 2018-07-13 PROCEDURE — A9270 NON-COVERED ITEM OR SERVICE: HCPCS | Performed by: OBSTETRICS & GYNECOLOGY

## 2018-07-13 PROCEDURE — 700102 HCHG RX REV CODE 250 W/ 637 OVERRIDE(OP): Performed by: ADVANCED PRACTICE MIDWIFE

## 2018-07-13 PROCEDURE — 10D17ZZ EXTRACTION OF PRODUCTS OF CONCEPTION, RETAINED, VIA NATURAL OR ARTIFICIAL OPENING: ICD-10-PCS | Performed by: OBSTETRICS & GYNECOLOGY

## 2018-07-13 PROCEDURE — 500448 HCHG DRESSING, TELFA 3X4: Performed by: OBSTETRICS & GYNECOLOGY

## 2018-07-13 PROCEDURE — 700111 HCHG RX REV CODE 636 W/ 250 OVERRIDE (IP): Performed by: OBSTETRICS & GYNECOLOGY

## 2018-07-13 PROCEDURE — 700101 HCHG RX REV CODE 250

## 2018-07-13 PROCEDURE — 502587 HCHG PACK, D&C: Performed by: OBSTETRICS & GYNECOLOGY

## 2018-07-13 PROCEDURE — 700101 HCHG RX REV CODE 250: Performed by: OBSTETRICS & GYNECOLOGY

## 2018-07-13 PROCEDURE — 160039 HCHG SURGERY MINUTES - EA ADDL 1 MIN LEVEL 3: Performed by: OBSTETRICS & GYNECOLOGY

## 2018-07-13 PROCEDURE — 160036 HCHG PACU - EA ADDL 30 MINS PHASE I: Performed by: OBSTETRICS & GYNECOLOGY

## 2018-07-13 PROCEDURE — 160009 HCHG ANES TIME/MIN: Performed by: OBSTETRICS & GYNECOLOGY

## 2018-07-13 RX ORDER — METHYLERGONOVINE MALEATE 0.2 MG/1
0.2 TABLET ORAL EVERY 6 HOURS
Status: DISCONTINUED | OUTPATIENT
Start: 2018-07-13 | End: 2018-07-14 | Stop reason: HOSPADM

## 2018-07-13 RX ORDER — OXYCODONE HYDROCHLORIDE AND ACETAMINOPHEN 5; 325 MG/1; MG/1
1 TABLET ORAL EVERY 6 HOURS PRN
Status: DISCONTINUED | OUTPATIENT
Start: 2018-07-13 | End: 2018-07-14 | Stop reason: HOSPADM

## 2018-07-13 RX ORDER — CEPHALEXIN 500 MG/1
500 CAPSULE ORAL EVERY 6 HOURS
Status: DISCONTINUED | OUTPATIENT
Start: 2018-07-13 | End: 2018-07-13

## 2018-07-13 RX ORDER — MAGNESIUM HYDROXIDE 1200 MG/15ML
LIQUID ORAL
Status: COMPLETED | OUTPATIENT
Start: 2018-07-13 | End: 2018-07-13

## 2018-07-13 RX ADMIN — AMPICILLIN SODIUM 2000 MG: 2 INJECTION, POWDER, FOR SOLUTION INTRAMUSCULAR; INTRAVENOUS at 04:25

## 2018-07-13 RX ADMIN — AMPICILLIN SODIUM 2000 MG: 2 INJECTION, POWDER, FOR SOLUTION INTRAMUSCULAR; INTRAVENOUS at 16:35

## 2018-07-13 RX ADMIN — AMPICILLIN SODIUM 2000 MG: 2 INJECTION, POWDER, FOR SOLUTION INTRAMUSCULAR; INTRAVENOUS at 00:19

## 2018-07-13 RX ADMIN — AMPICILLIN SODIUM 2000 MG: 2 INJECTION, POWDER, FOR SOLUTION INTRAMUSCULAR; INTRAVENOUS at 23:38

## 2018-07-13 RX ADMIN — AMPICILLIN SODIUM 2000 MG: 2 INJECTION, POWDER, FOR SOLUTION INTRAMUSCULAR; INTRAVENOUS at 08:00

## 2018-07-13 RX ADMIN — GENTAMICIN SULFATE 240 MG: 40 INJECTION, SOLUTION INTRAMUSCULAR; INTRAVENOUS at 02:45

## 2018-07-13 RX ADMIN — SODIUM CHLORIDE, POTASSIUM CHLORIDE, SODIUM LACTATE AND CALCIUM CHLORIDE: 600; 310; 30; 20 INJECTION, SOLUTION INTRAVENOUS at 06:11

## 2018-07-13 RX ADMIN — METHYLERGONOVINE MALEATE 0.2 MG: 0.2 TABLET ORAL at 21:55

## 2018-07-13 RX ADMIN — METHYLERGONOVINE MALEATE 0.2 MG: 0.2 TABLET ORAL at 01:19

## 2018-07-13 RX ADMIN — CLINDAMYCIN IN 5 PERCENT DEXTROSE 900 MG: 18 INJECTION, SOLUTION INTRAVENOUS at 12:09

## 2018-07-13 RX ADMIN — SODIUM CHLORIDE, POTASSIUM CHLORIDE, SODIUM LACTATE AND CALCIUM CHLORIDE: 600; 310; 30; 20 INJECTION, SOLUTION INTRAVENOUS at 16:27

## 2018-07-13 RX ADMIN — CLINDAMYCIN IN 5 PERCENT DEXTROSE 900 MG: 18 INJECTION, SOLUTION INTRAVENOUS at 01:40

## 2018-07-13 ASSESSMENT — PAIN SCALES - GENERAL
PAINLEVEL_OUTOF10: 0
PAINLEVEL_OUTOF10: 0
PAINLEVEL_OUTOF10: 2
PAINLEVEL_OUTOF10: 0
PAINLEVEL_OUTOF10: 1
PAINLEVEL_OUTOF10: 0
PAINLEVEL_OUTOF10: 1

## 2018-07-13 NOTE — PROGRESS NOTES
Spoke with patient and FOB with assistance of Language Line IPad , explained that staff are happy to provide latch assistance and be sure pumping is going well, explained however that since baby is not a patient we can not provide care to infant or assess infant, explained that they need to continue following feeding plan including pumping and supplementing with pumped milk in addition to breast feeding, also explained that we cannot call to have physician see baby, explained that any infant health concerns must be addressed by baby's pediatrician after discharge, information provided on outpatient assistance available at [facility] and contact information provided.    Plan  BF Q 3 hours  Pump for 15 minutes after BF  Supplement with pumped milk    Encouraged to follow-up with pediatrician and/or outpatient lactation provider regarding continuing supplement volumes as well as for continued assessment of and assistance with BF and pumping.

## 2018-07-13 NOTE — PROGRESS NOTES
Pharmacy Kinetics 32 y.o. female on gentamicin day # 2  2018    Dosing Weight: 48 kg  Currently on Gentamicin 240 mg iv q24hr     Indication for treatment: endometritis, rule out septic thrombophlebitis     Pertinent history per medical record: Admitted on 2018 for possible endometritis, possible septic thrombophlebitis.  Delivered 2018 and now returns febrile with chills.     Other antibiotics: Clindamycin 900 mg IV q 12 hours, Ampicillin 2 g IV q 4 hours     Allergies: Patient has no known allergies.      List concerns for renal function: none     Pertinent cultures to date:   None reporting in house.    Recent Labs      18   2307   WBC  10.9*   NEUTSPOLYS  72.00     Recent Labs      18   2307   BUN  10   CREATININE  0.51   ALBUMIN  3.6     No results for input(s): GENTTROUGH, GENTPEAK, GENTRANDOM in the last 72 hours.No intake or output data in the 24 hours ending 18 1622   Blood pressure 105/71, pulse 68, temperature 36.6 °C (97.8 °F), resp. rate 18, weight 51.3 kg (113 lb), SpO2 97 %, currently breastfeeding. Temp (24hrs), Av.6 °C (97.8 °F), Min:35.7 °C (96.2 °F), Max:37.6 °C (99.6 °F)      A/P   1. Gentamicin dose change: not indicated  2. Next gentamicin level: will order tomorrow if not discontinued/discharged  3. Goal trough: < 0.18 mcg/mL  4. Comments: Afebrile.  No I/O data.  Monitor duration.    TRAVIS Watts, PharmD, BCPS

## 2018-07-13 NOTE — PROGRESS NOTES
S:  Pt passed another clot last night.  No pain now.  O: Afebrile       Ultrasound:  Herterogenous material in the endometrial cavity.  A:  Rule out retained placental/decidual tissues  P:  Recommend D&C to remove remaining material.       Consent obtained.    Time:  15 minutse

## 2018-07-13 NOTE — PROGRESS NOTES
Patient called out that she had passed something resembling a clot. This RN pulled from the toilet a piece of tissue, placed it in a specimen cup and called Dr. Valerio. Orders received for patient to be NPO. Specimen on ice in cup of saline.

## 2018-07-13 NOTE — PROGRESS NOTES
Assessment complete. POC discussed and patient verbalized understanding. All questions answered. Will continue to assess.

## 2018-07-14 VITALS
SYSTOLIC BLOOD PRESSURE: 95 MMHG | DIASTOLIC BLOOD PRESSURE: 66 MMHG | BODY MASS INDEX: 21.33 KG/M2 | WEIGHT: 113 LBS | OXYGEN SATURATION: 96 % | TEMPERATURE: 98.2 F | RESPIRATION RATE: 20 BRPM | HEART RATE: 69 BPM

## 2018-07-14 PROBLEM — O60.00 PREMATURE LABOR: Status: RESOLVED | Noted: 2018-06-12 | Resolved: 2018-07-14

## 2018-07-14 LAB
BACTERIA UR CULT: NORMAL
SIGNIFICANT IND 70042: NORMAL
SITE SITE: NORMAL
SOURCE SOURCE: NORMAL

## 2018-07-14 PROCEDURE — 700105 HCHG RX REV CODE 258: Performed by: OBSTETRICS & GYNECOLOGY

## 2018-07-14 PROCEDURE — 700112 HCHG RX REV CODE 229: Performed by: ADVANCED PRACTICE MIDWIFE

## 2018-07-14 PROCEDURE — A9270 NON-COVERED ITEM OR SERVICE: HCPCS | Performed by: ADVANCED PRACTICE MIDWIFE

## 2018-07-14 PROCEDURE — 700111 HCHG RX REV CODE 636 W/ 250 OVERRIDE (IP): Performed by: OBSTETRICS & GYNECOLOGY

## 2018-07-14 PROCEDURE — 700102 HCHG RX REV CODE 250 W/ 637 OVERRIDE(OP): Performed by: OBSTETRICS & GYNECOLOGY

## 2018-07-14 PROCEDURE — 700101 HCHG RX REV CODE 250: Performed by: OBSTETRICS & GYNECOLOGY

## 2018-07-14 PROCEDURE — 700102 HCHG RX REV CODE 250 W/ 637 OVERRIDE(OP): Performed by: ADVANCED PRACTICE MIDWIFE

## 2018-07-14 PROCEDURE — A9270 NON-COVERED ITEM OR SERVICE: HCPCS | Performed by: OBSTETRICS & GYNECOLOGY

## 2018-07-14 RX ORDER — PSEUDOEPHEDRINE HCL 30 MG
100 TABLET ORAL 2 TIMES DAILY PRN
Qty: 60 CAP | Refills: 2 | Status: SHIPPED | OUTPATIENT
Start: 2018-07-14

## 2018-07-14 RX ORDER — SODIUM CHLORIDE 9 MG/ML
INJECTION, SOLUTION INTRAVENOUS
Status: DISCONTINUED
Start: 2018-07-14 | End: 2018-07-14 | Stop reason: HOSPADM

## 2018-07-14 RX ORDER — CEPHALEXIN 500 MG/1
500 CAPSULE ORAL 4 TIMES DAILY
Qty: 40 CAP | Refills: 0 | Status: SHIPPED | OUTPATIENT
Start: 2018-07-14

## 2018-07-14 RX ADMIN — DOCUSATE SODIUM 100 MG: 100 CAPSULE ORAL at 07:55

## 2018-07-14 RX ADMIN — CLINDAMYCIN IN 5 PERCENT DEXTROSE 900 MG: 18 INJECTION, SOLUTION INTRAVENOUS at 00:23

## 2018-07-14 RX ADMIN — METHYLERGONOVINE MALEATE 0.2 MG: 0.2 TABLET ORAL at 08:50

## 2018-07-14 RX ADMIN — AMPICILLIN SODIUM 2000 MG: 2 INJECTION, POWDER, FOR SOLUTION INTRAMUSCULAR; INTRAVENOUS at 04:14

## 2018-07-14 RX ADMIN — Medication 1 TABLET: at 07:55

## 2018-07-14 RX ADMIN — AMPICILLIN SODIUM 2000 MG: 2 INJECTION, POWDER, FOR SOLUTION INTRAMUSCULAR; INTRAVENOUS at 07:55

## 2018-07-14 RX ADMIN — GENTAMICIN SULFATE 240 MG: 40 INJECTION, SOLUTION INTRAMUSCULAR; INTRAVENOUS at 02:01

## 2018-07-14 ASSESSMENT — PAIN SCALES - GENERAL
PAINLEVEL_OUTOF10: 0

## 2018-07-14 NOTE — DISCHARGE INSTRUCTIONS
POSTPARTUM DISCHARGE INSTRUCTIONS FOR MOM    YOB: 1985   Age: 32 y.o.               Admit Date: 2018     Discharge Date: 2018  Attending Doctor:  Reymundo Valerio M.D.                  Allergies:  Patient has no known allergies.    Discharged to home by car. Discharged via wheelchair, hospital escort: Yes.  Special equipment needed: Not Applicable  Belongings with: Personal  Be sure to schedule a follow-up appointment with your primary care doctor or any specialists as instructed.     Discharge Plan:   Diet Plan: Discussed  Activity Level: Discussed  Confirmed Follow up Appointment: Patient to Call and Schedule Appointment  Confirmed Symptoms Management: Discussed  Medication Reconciliation Updated: Yes    REASONS TO CALL YOUR OBSTETRICIAN:  1.   Persistent fever or shaking chills (Temperature higher than 100.4)  2.   Heavy bleeding (soaking more than 1 pad per hour); Passing clots  3.   Foul odor from vagina  4.   Mastitis (Breast infection; breast pain, chills, fever, redness)  5.   Urinary pain, burning or frequency  6.   Episiotomy infection  7.   Abdominal incision infection  8.   Severe depression longer than 24 hours    HAND WASHING  · Prior to handling the baby.  · Before breastfeeding or bottle feeding baby.  · After using the bathroom or changing the baby's diaper.    WOUND CARE  Ask your physician for additional care instructions.  In general:    ·  Incision:      · Keep clean and dry.    · Do NOT lift anything heavier than your baby for up to 6 weeks.    · There should not be any opening or pus.      VAGINAL CARE  · Nothing inside vagina for 6 weeks: no sexual intercourse, tampons or douching.  · Bleeding may continue for 2-4 weeks.  Amount may vary.    · Call your physician for heavy bleeding which means soaking more than 1 pad per hour    BIRTH CONTROL  · It is possible to become pregnant at any time after delivery and while breastfeeding.  · Plan to discuss a method of birth  "control with your physician at your follow up visit. visit.    DIET AND ELIMINATION  · Eating more fiber (bran cereal, fruits, and vegetables) and drinking plenty of fluids will help to avoid constipation.  · Urinary frequency after childbirth is normal.    POSTPARTUM BLUES  During the first few days after birth, you may experience a sense of the \"blues\" which may include impatience, irritability or even crying.  These feeling come and go quickly.  However, as many as 1 in 10 women experience emotional symptoms known as postpartum depression.    Postpartum depression:  May start as early as the second or third day after delivery or take several weeks or months to develop.  Symptoms of \"blues\" are present, but are more intense:  Crying spells; loss of appetite; feelings of hopelessness or loss of control; fear of touching the baby; over concern or no concern at all about the baby; little or no concern about your own appearance/caring for yourself; and/or inability to sleep or excessive sleeping.  Contact your physician if you are experiencing any of these symptoms.    Crisis Hotline:  · [place] Crisis Hotline:  0-364-NXAVHEI  Or 1-787.276.2674  · [place] Crisis Hotline:  1-796.634.2574  Or 575-690-5905    PREVENTING SHAKEN BABY:  If you are angry or stressed, PUT THE BABY IN THE CRIB, step away, take some deep breaths, and wait until you are calm to care for the baby.  DO NOT SHAKE THE BABY.  You are not alone, call a supporter for help.    · Crisis Call Center 24/7 crisis line 649-752-2128 or 1-112.968.5775  · You can also text them, text \"ANSWER\" to 329135    QUIT SMOKING/TOBACCO USE:  I understand the use of any tobacco products increases my chance of suffering from future heart disease and could cause other illnesses which may shorten my life. Quitting the use of tobacco products is the single most important thing I can do to improve my health. For further information on smoking / tobacco cessation call a Toll " Free Quit Line at 1-556.674.7691 (*[employer]) or 1-103.496.7081 (American Lung Association) or you can access the web based program at www.lungusa.org.    · [place] Tobacco Users Help Line:  (870) 820-2531       Toll Free: 1-166.251.6644  · Quit Tobacco Program [facility] (735)261-1874    DEPRESSION / SUICIDE RISK:  As you are discharged from this [facility], it is important to learn how to keep safe from harming yourself.    Recognize the warning signs:  · Abrupt changes in personality, positive or negative- including increase in energy   · Giving away possessions  · Change in eating patterns- significant weight changes-  positive or negative  · Change in sleeping patterns- unable to sleep or sleeping all the time   · Unwillingness or inability to communicate  · Depression  · Unusual sadness, discouragement and loneliness  · Talk of wanting to die  · Neglect of personal appearance   · Rebelliousness- reckless behavior  · Withdrawal from people/activities they love  · Confusion- inability to concentrate     If you or a loved one observes any of these behaviors or has concerns about self-harm, here's what you can do:  · Talk about it- your feelings and reasons for harming yourself  · Remove any means that you might use to hurt yourself (examples: pills, rope, extension cords, firearm)  · Get professional help from the community (Mental Health, Substance Abuse, psychological counseling)  · Do not be alone:Call your Safe Contact- someone whom you trust who will be there for you.  · Call your local CRISIS HOTLINE 691-5485 or 303-847-3949  · Call your local Children's Mobile Crisis Response Team [place] (851) 757-7042 or www.[employer]  · Call the toll free National Suicide Prevention Hotlines   · National Suicide Prevention Lifeline 403-262-EBGT (7274)  · National Hope Line Network 800-SUICIDE (789-5747)    DISCHARGE SURVEY:  Thank you for choosing [facility].  We hope we provided you with very good care.  You may be receiving a survey in the mail.  Please fill it out.  Your opinion is valuable to us.    ADDITIONAL EDUCATIONAL MATERIALS GIVEN TO PATIENT:        My signature on this form indicates that:  1.  I have reviewed and understand the above information  2.  My questions regarding this information have been answered to my satisfaction.  3.  I have formulated a plan with my discharge nurse to obtain my prescribed medication for home.

## 2018-07-14 NOTE — PROGRESS NOTES
Pharmacy Kinetics 32 y.o. female on gentamicin day # 3 2018    Dosing Weight: 48 kg  Currently on Gentamicin 240 mg iv q24hr     Indication for treatment: endometritis, rule out septic thrombophlebitis     Pertinent history per medical record: Admitted on 2018 for possible endometritis, possible septic thrombophlebitis.  Delivered 2018 and now returns febrile with chills.     Other antibiotics: Clindamycin 900 mg IV q 12 hours, Ampicillin 2 g IV q 4 hours     Allergies: Patient has no known allergies.      List concerns for renal function: none    Pertinent cultures to date:   Results for GARFIELD BUSTOS (MRN [id number]) as of 2018 09:51   2018 23:07 2018 23:30   Significant Indicator NEG NEG   Site PERIPHERAL URINE, CLEAN CATCH   Source BLD UR       Recent Labs      18   2307   WBC  10.9*   NEUTSPOLYS  72.00     Recent Labs      18   2307   BUN  10   CREATININE  0.51   ALBUMIN  3.6     No results for input(s): GENTTROUGH, GENTPEAK, GENTRANDOM in the last 72 hours.  Intake/Output Summary (Last 24 hours) at 18 0950  Last data filed at 18 2100   Gross per 24 hour   Intake              775 ml   Output              100 ml   Net              675 ml      Blood pressure 100/58, pulse (!) 56, temperature 37 °C (98.6 °F), resp. rate 16, weight 51.3 kg (113 lb), SpO2 97 %, currently breastfeeding. Temp (24hrs), Av.6 °C (97.8 °F), Min:36.2 °C (97.1 °F), Max:37 °C (98.6 °F)      A/P   1. Gentamicin dose change: not indicated  2. Next gentamicin level: tonight @ 0130, before 0200 dose  3. Goal trough: < 0.18 mcg/mL  4. Comments: D&C last night for retained tissues.  Afebrile now, but not going home today.  Trough tonight.  Verify clearance.    TRAVIS Watts, PharmD, BCPS

## 2018-07-14 NOTE — PROGRESS NOTES
Pt up in rm.  Light lochia. Firm @.  Iv intact. c/o pain with antib infusion.  Slowed the medication and more comfortable.  Finished.  Wants to go home.  Waiting for drMarisa Voiding qs. Eating well. Passing gas.

## 2018-07-14 NOTE — OP REPORT
DATE OF SERVICE:  07/13/2018    PROCEDURE:  Uterine curettage.    PREOPERATIVE DIAGNOSES:  1.  Status postpartum hemorrhage.  2.  Probable endometritis.  3.  Retained placenta and membranes.    POSTOPERATIVE DIAGNOSES:  1.  Status postpartum hemorrhage.  2.  Probable endometritis.  3.  Retained placenta and membranes.    SURGEON:  Angel Luis Valerio MD    ANESTHESIA:  General.    ANESTHESIOLOGIST:  Dawson Pickens DO    OPERATIVE FINDINGS:  Residual membranes and/or placental tissues,   intrauterine, and blood clots.    DESCRIPTION OF PROCEDURE:  After induction of general anesthetic, the patient   was placed in dorsal lithotomy position and appropriate timeouts were called.    Vagina was prepped in usual manner for vaginal procedure.  Weighted speculum   was then placed and the cervix was stabilized with a ring forceps.  Cervix was   found to be recently dilated allowing up to #12 dilator to be placed easily.    A 9 mm curved suction curette was introduced under ultrasound guidance.    Suction curettage was carried out removing moderate amounts of membranes and   what appeared to be decidual type tissues.  Small amount of clot.  After no   further tissues were removed, 4-quadrant search with sharp curette revealed no   remaining tissues.  The patient had some bleeding responded to Methergine 0.2   mg IM.    Suction curettage was again reintroduced to be sure all remaining tissues were   removed.    The uterus was massaged until there was adequate hemostasis.  The patient   tolerated the procedure well.    ESTIMATED BLOOD LOSS:  50 mL.    Returned to recovery in satisfactory condition.       ____________________________________     ANGEL LUIS VALERIO MD    EYO / NTS    DD:  07/13/2018 19:54:38  DT:  07/13/2018 20:18:34    D#:  [id number]  Job#:  [id number]

## 2018-07-14 NOTE — PROGRESS NOTES
Iv d/c. Ready for home with instructions and rx's.  VS stable. Eating well. Lochia light. Fun firm. Voiding qs.

## 2018-07-14 NOTE — DISCHARGE SUMMARY
Post Partum Progress Note    Name:   Donna Morocho   Date/Time:  7/14/2018 - 12:01 PM  Chief Admitting Dx:  Postpartum Endometritis  Discharge Dx:  Postpartum Endometritis r/t retained necrotic decidual tissue  Delivery Type:  vaginal, spontaneous  Post-Op/Post Partum Days #:  12    Subjective:  Abdominal pain: no  Ambulating:   yes  Tolerating liquids:  yes  Tolerating food:  yes common adult  Flatus:   yes  BM:    yes  Bleeding:   with a small amount of bleeding  Voiding:   yes  Dizziness:   no  Feeding:   breast    Vitals:    07/13/18 2215 07/14/18 0000 07/14/18 0400 07/14/18 0800   BP: 109/69 115/77 100/58 (!) 95/66   Pulse: 60 61 (!) 56 69   Resp: 16 16 16 20   Temp: 36.4 °C (97.5 °F) 36.4 °C (97.6 °F) 37 °C (98.6 °F) 36.8 °C (98.2 °F)   SpO2: 98% 98% 97% 96%   Weight:           Exam:  Breast: Soft, nontender, intact   Abdomen: Abdomen soft, non-tender. BS normal. No masses,  No organomegaly, negative findings: soft, non-tender  Fundal Tenderness:  no  Fundus Firm: yes  Incision: none  Below umbilicus: N\A  Perineum: perineum intact  Lochia: mild  Extremities: Normal extremities, peripheral pulses and reflexes normal, no edema, redness or tenderness in the calves or thighs    Meds:  Current Facility-Administered Medications   Medication Dose   • SODIUM CHLORIDE 0.9 % IV SOLN     • oxyCODONE-acetaminophen (PERCOCET) 5-325 MG per tablet 1 Tab  1 Tab   • methylergonovine (METHERGINE) tablet 0.2 mg  0.2 mg   • prenatal plus vitamin (STUARTNATAL 1+1) 27-1 MG tablet 1 Tab  1 Tab   • docusate sodium (COLACE) capsule 100 mg  100 mg   • ampicillin (OMNIPEN) 2,000 mg in  mL IVPB  2,000 mg   • ibuprofen (MOTRIN) tablet 600 mg  600 mg   • lactated ringers infusion     • MD ALERT... gentamicin per pharmacy protocol     • clindamycin (CLEOCIN) IVPB premix 900 mg  900 mg   • acetaminophen (TYLENOL) tablet 650 mg  650 mg   • gentamicin (GARAMYCIN) 240 mg in  mL IVPB  5 mg/kg (Ideal)       Labs:   Recent Labs       07/11/18   2307   WBC  10.9*   RBC  4.22   HEMOGLOBIN  12.7   HEMATOCRIT  40.5   MCV  96.0   MCH  30.1   MCHC  31.4*   RDW  45.6   PLATELETCT  337   MPV  10.0       Assessment:  Chief Admitting Dx:  Postpartum Endometritis  Discharge Dx:             Endometritis r/t retained necrotic decidual tissue  Delivery Type:  vaginal, spontaneous  Tubal Ligation:  no    Plan:  Discharge home today  Follow up in the office next week Tuesday or Wednesday  Call if she develops fever or pain.   Discharge on Keflex 500mg po q 6 hours x 10 days     YVONNE MartinezP.R.NMarisa, [facility]    Total time: 20 minutes

## 2018-07-14 NOTE — PROGRESS NOTES
1250- Discharge education discussed with patient.  Patient verbalized understanding.  Prescriptions handed to patient.  Patient verbalized understanding of follow up appointments for herself.

## 2018-07-14 NOTE — OR NURSING
Pt on room air.  No c/o nausea, tolerating sips of warm water.  No c/o pain.  Kathleen pad and mesh underwear in place.  MCAMHAN, VSS, afebrile.  A/O x4.

## 2018-07-14 NOTE — CARE PLAN
Problem: Infection  Goal: Will remain free from infection  Outcome: PROGRESSING AS EXPECTED  VSS throughout shift    Problem: Pain Management  Goal: Pain level will decrease to patient's comfort goal  Outcome: PROGRESSING AS EXPECTED

## 2018-07-14 NOTE — OR SURGEON
Immediate Post OP Note    PreOp Diagnosis: Postpartum hemorrhage                                Endometritis                                Retained placental and//or membranes     PostOp Diagnosis: Same    Procedure(s):  DILATION AND CURETTAGE    Surgeon(s):  Reymundo Valerio M.D.    Anesthesiologist/Type of Anesthesia:  Anesthesiologist: Dawson Pickens D.O./* No anesthesia type entered *    Surgical Staff:  Circulator: Dimple Corbin R.N.  Scrub Person: Opal Gonzáles    Specimens removed if any:  Curettings    Estimated Blood Loss: 50 ml    Findings: Membranes and possible placental tissues    Complications: None        7/13/2018 7:48 PM Reymundo Valerio M.D.

## 2018-07-14 NOTE — PROGRESS NOTES
Pt returned to S315 from [place] OR. NPO discontinued, all previous orders resumed. Patient complaining of generalized discomfort 2/10 and declines pain medication at this time. Will administer antibiotics and maintenance fluid per MAR.

## 2018-07-17 LAB
BACTERIA BLD CULT: NORMAL
SIGNIFICANT IND 70042: NORMAL
SITE SITE: NORMAL
SOURCE SOURCE: NORMAL

## 2023-03-10 NOTE — CARE PLAN
As we discussed, the mainstay of treatment is supportive care with use of Tylenol and ibuprofen as necessary for fever control. Over-the-counter medications such as over-the-counter nasal decongestions and cough medicine are also okay to take. Please use the inhalers as needed for further symptom management. If your symptoms persist or your shortness of breath worsens or if you develop severe chest pain, difficulty breathing, severe abdominal pain, severe nausea with vomiting, or uncontrolled fevers please return to the emergency room. Problem: Psychosocial needs  Goal: Anxiety reduction    Intervention: Stimuli reduction, calming techniques  Lights dimmed to create calm atmosphere for relaxation and rest.      Problem: Risk for Fluid Imbalance  Goal: Promotion of Fluid Balance    Intervention: Promote oral intake as appropriate  Pt encouraged to drink fluids for fluid balance.

## (undated) DEVICE — GLOVE BIOGEL PI INDICATOR SZ 7.0 SURGICAL PF LF - (50/BX 4BX/CA)

## (undated) DEVICE — NEPTUNE 4 PORT MANIFOLD - (20/PK)

## (undated) DEVICE — KIT ANESTHESIA W/CIRCUIT & 3/LT BAG W/FILTER (20EA/CA)

## (undated) DEVICE — SENSOR SPO2 NEO LNCS ADHESIVE (20/BX) SEE USER NOTES

## (undated) DEVICE — Device

## (undated) DEVICE — GOWN WARMING STANDARD FLEX - (30/CA)

## (undated) DEVICE — SET LEADWIRE 5 LEAD BEDSIDE DISPOSABLE ECG (1SET OF 5/EA)

## (undated) DEVICE — SLEEVE, VASO, THIGH, MED

## (undated) DEVICE — PAD SANITARY 11IN MAXI IND WRAPPED  (12EA/PK 24PK/CA)

## (undated) DEVICE — DRAPE LARGE 3 QUARTER - (20/CA)

## (undated) DEVICE — TRAY SRGPRP PVP IOD WT PRP - (20/CA)

## (undated) DEVICE — PROTECTOR ULNA NERVE - (36PR/CA)

## (undated) DEVICE — MASK ANESTHESIA ADULT  - (100/CA)

## (undated) DEVICE — SODIUM CHL IRRIGATION 0.9% 1000ML (12EA/CA)

## (undated) DEVICE — CANISTER SUCTION 3000ML MECHANICAL FILTER AUTO SHUTOFF MEDI-VAC NONSTERILE LF DISP  (40EA/CA)

## (undated) DEVICE — MASK AIRWAY SIZE 3 UNIQUE SILICON (10/BX)

## (undated) DEVICE — DRAPE UNDER BUTTOCKS FLUID - (20/CA)

## (undated) DEVICE — KIT ROOM DECONTAMINATION

## (undated) DEVICE — BRIEF STRETCH MATERNITY M/L - FITS 20-60IN (5EA/BG 20BG/CA)

## (undated) DEVICE — DRESSING NON ADHERENT 3 X 4 - STERILE (100/BX 12BX/CA)

## (undated) DEVICE — SET EXTENSION WITH 2 PORTS (48EA/CA) ***PART #2C8610 IS A SUBSTITUTE*****

## (undated) DEVICE — TUBING CLEARLINK DUO-VENT - C-FLO (48EA/CA)

## (undated) DEVICE — LACTATED RINGERS INJ 1000 ML - (14EA/CA 60CA/PF)

## (undated) DEVICE — HEAD HOLDER JUNIOR/ADULT

## (undated) DEVICE — GOWN SURGICAL XX-LARGE - (28EA/CA) SIRUS NON REINFORCED

## (undated) DEVICE — VACURETTE 9MM CURVED 10/PKG

## (undated) DEVICE — KIT D & C COLLECTION (10EA/PK)

## (undated) DEVICE — DRAPE UNDER BUTTOCK LRG (40/CA)

## (undated) DEVICE — GLOVE, BIOGEL ECLIPSE, SZ 7.0, PF LTX (50/BX)

## (undated) DEVICE — ELECTRODE 850 FOAM ADHESIVE - HYDROGEL RADIOTRNSPRNT (50/PK)

## (undated) DEVICE — SUCTION INSTRUMENT YANKAUER BULBOUS TIP W/O VENT (50EA/CA)

## (undated) DEVICE — TUBING D & E COLLECTION SET (50EA/PK)